# Patient Record
Sex: FEMALE | Race: BLACK OR AFRICAN AMERICAN | NOT HISPANIC OR LATINO | Employment: FULL TIME | URBAN - METROPOLITAN AREA
[De-identification: names, ages, dates, MRNs, and addresses within clinical notes are randomized per-mention and may not be internally consistent; named-entity substitution may affect disease eponyms.]

---

## 2017-01-10 ENCOUNTER — OFFICE VISIT - HEALTHEAST (OUTPATIENT)
Dept: FAMILY MEDICINE | Facility: CLINIC | Age: 27
End: 2017-01-10

## 2017-01-10 ENCOUNTER — COMMUNICATION - HEALTHEAST (OUTPATIENT)
Dept: TELEHEALTH | Facility: CLINIC | Age: 27
End: 2017-01-10

## 2017-01-10 DIAGNOSIS — R53.83 FATIGUE: ICD-10-CM

## 2017-01-10 DIAGNOSIS — E55.9 VITAMIN D DEFICIENCY: ICD-10-CM

## 2017-01-10 DIAGNOSIS — Z00.00 ROUTINE GENERAL MEDICAL EXAMINATION AT A HEALTH CARE FACILITY: ICD-10-CM

## 2017-01-10 LAB
CHOLEST SERPL-MCNC: 183 MG/DL
FASTING STATUS PATIENT QL REPORTED: YES
HDLC SERPL-MCNC: 58 MG/DL
LDLC SERPL CALC-MCNC: 117 MG/DL
TRIGL SERPL-MCNC: 42 MG/DL

## 2017-01-10 ASSESSMENT — MIFFLIN-ST. JEOR: SCORE: 1493.59

## 2017-02-06 ENCOUNTER — COMMUNICATION - HEALTHEAST (OUTPATIENT)
Dept: SCHEDULING | Facility: CLINIC | Age: 27
End: 2017-02-06

## 2017-02-07 ENCOUNTER — OFFICE VISIT - HEALTHEAST (OUTPATIENT)
Dept: FAMILY MEDICINE | Facility: CLINIC | Age: 27
End: 2017-02-07

## 2017-02-07 DIAGNOSIS — R42 VERTIGO: ICD-10-CM

## 2017-02-07 ASSESSMENT — MIFFLIN-ST. JEOR: SCORE: 1516.04

## 2017-02-13 ENCOUNTER — OFFICE VISIT - HEALTHEAST (OUTPATIENT)
Dept: OCCUPATIONAL THERAPY | Facility: REHABILITATION | Age: 27
End: 2017-02-13

## 2017-02-13 DIAGNOSIS — R26.81 UNSTEADINESS ON FEET: ICD-10-CM

## 2017-02-13 DIAGNOSIS — R11.0 NAUSEA: ICD-10-CM

## 2017-02-13 DIAGNOSIS — H81.11 BPPV (BENIGN PAROXYSMAL POSITIONAL VERTIGO), RIGHT: ICD-10-CM

## 2018-05-08 ENCOUNTER — OFFICE VISIT (OUTPATIENT)
Dept: FAMILY MEDICINE | Facility: CLINIC | Age: 28
End: 2018-05-08
Payer: COMMERCIAL

## 2018-05-08 VITALS
OXYGEN SATURATION: 100 % | HEART RATE: 63 BPM | BODY MASS INDEX: 27.54 KG/M2 | DIASTOLIC BLOOD PRESSURE: 62 MMHG | RESPIRATION RATE: 16 BRPM | WEIGHT: 171.38 LBS | HEIGHT: 66 IN | SYSTOLIC BLOOD PRESSURE: 103 MMHG | TEMPERATURE: 96.9 F

## 2018-05-08 DIAGNOSIS — Z00.00 ROUTINE GENERAL MEDICAL EXAMINATION AT A HEALTH CARE FACILITY: Primary | ICD-10-CM

## 2018-05-08 LAB
ALBUMIN SERPL-MCNC: 3.7 G/DL (ref 3.4–5)
ALP SERPL-CCNC: 58 U/L (ref 40–150)
ALT SERPL W P-5'-P-CCNC: 18 U/L (ref 0–50)
ANION GAP SERPL CALCULATED.3IONS-SCNC: 8 MMOL/L (ref 3–14)
AST SERPL W P-5'-P-CCNC: 11 U/L (ref 0–45)
BASOPHILS # BLD AUTO: 0 10E9/L (ref 0–0.2)
BASOPHILS NFR BLD AUTO: 0.4 %
BILIRUB SERPL-MCNC: 0.3 MG/DL (ref 0.2–1.3)
BUN SERPL-MCNC: 9 MG/DL (ref 7–30)
CALCIUM SERPL-MCNC: 8.9 MG/DL (ref 8.5–10.1)
CHLORIDE SERPL-SCNC: 109 MMOL/L (ref 94–109)
CHOLEST SERPL-MCNC: 155 MG/DL
CO2 SERPL-SCNC: 23 MMOL/L (ref 20–32)
CREAT SERPL-MCNC: 0.61 MG/DL (ref 0.52–1.04)
DIFFERENTIAL METHOD BLD: NORMAL
EOSINOPHIL # BLD AUTO: 0 10E9/L (ref 0–0.7)
EOSINOPHIL NFR BLD AUTO: 0.6 %
ERYTHROCYTE [DISTWIDTH] IN BLOOD BY AUTOMATED COUNT: 12.9 % (ref 10–15)
GFR SERPL CREATININE-BSD FRML MDRD: >90 ML/MIN/1.7M2
GLUCOSE SERPL-MCNC: 84 MG/DL (ref 70–99)
HCT VFR BLD AUTO: 39.1 % (ref 35–47)
HDLC SERPL-MCNC: 46 MG/DL
HGB BLD-MCNC: 12.4 G/DL (ref 11.7–15.7)
LDLC SERPL CALC-MCNC: 100 MG/DL
LYMPHOCYTES # BLD AUTO: 1.9 10E9/L (ref 0.8–5.3)
LYMPHOCYTES NFR BLD AUTO: 36 %
MCH RBC QN AUTO: 29.4 PG (ref 26.5–33)
MCHC RBC AUTO-ENTMCNC: 31.7 G/DL (ref 31.5–36.5)
MCV RBC AUTO: 93 FL (ref 78–100)
MONOCYTES # BLD AUTO: 0.5 10E9/L (ref 0–1.3)
MONOCYTES NFR BLD AUTO: 9.7 %
NEUTROPHILS # BLD AUTO: 2.8 10E9/L (ref 1.6–8.3)
NEUTROPHILS NFR BLD AUTO: 53.3 %
NONHDLC SERPL-MCNC: 109 MG/DL
PLATELET # BLD AUTO: 212 10E9/L (ref 150–450)
POTASSIUM SERPL-SCNC: 4.2 MMOL/L (ref 3.4–5.3)
PROT SERPL-MCNC: 7.5 G/DL (ref 6.8–8.8)
RBC # BLD AUTO: 4.22 10E12/L (ref 3.8–5.2)
SODIUM SERPL-SCNC: 140 MMOL/L (ref 133–144)
TRIGL SERPL-MCNC: 47 MG/DL
WBC # BLD AUTO: 5.3 10E9/L (ref 4–11)

## 2018-05-08 PROCEDURE — 85025 COMPLETE CBC W/AUTO DIFF WBC: CPT | Performed by: FAMILY MEDICINE

## 2018-05-08 PROCEDURE — 99385 PREV VISIT NEW AGE 18-39: CPT | Performed by: FAMILY MEDICINE

## 2018-05-08 PROCEDURE — 36415 COLL VENOUS BLD VENIPUNCTURE: CPT | Performed by: FAMILY MEDICINE

## 2018-05-08 PROCEDURE — 80053 COMPREHEN METABOLIC PANEL: CPT | Performed by: FAMILY MEDICINE

## 2018-05-08 PROCEDURE — 80061 LIPID PANEL: CPT | Performed by: FAMILY MEDICINE

## 2018-05-08 NOTE — MR AVS SNAPSHOT
After Visit Summary   5/8/2018    Ailyn Keane    MRN: [id number]           Patient Information     Date Of Birth          1990        Visit Information        Provider Department      5/8/2018 9:00 AM Gwen Quintero MD [facility]        Today's Diagnoses     Routine general medical examination at a health care facility    -  1      Care Instructions      Preventive Health Recommendations  Female Ages 26 - 39  Yearly exam:   See your health care provider every year in order to    Review health changes.     Discuss preventive care.      Review your medicines if you your doctor has prescribed any.    Until age 30: Get a Pap test every three years (more often if you have had an abnormal result).    After age 30: Talk to your doctor about whether you should have a Pap test every 3 years or have a Pap test with HPV screening every 5 years.   You do not need a Pap test if your uterus was removed (hysterectomy) and you have not had cancer.  You should be tested each year for STDs (sexually transmitted diseases), if you're at risk.   Talk to your provider about how often to have your cholesterol checked.  If you are at risk for diabetes, you should have a diabetes test (fasting glucose).  Shots: Get a flu shot each year. Get a tetanus shot every 10 years.   Nutrition:     Eat at least 5 servings of fruits and vegetables each day.    Eat whole-grain bread, whole-wheat pasta and brown rice instead of white grains and rice.    Talk to your provider about Calcium and Vitamin D.     Lifestyle    Exercise at least 150 minutes a week (30 minutes a day, 5 days of the week). This will help you control your weight and prevent disease.    Limit alcohol to one drink per day.    No smoking.     Wear sunscreen to prevent skin cancer.    See your dentist every six months for an exam and cleaning.            Follow-ups after your visit        Who to contact     If you have  "questions or need follow up information about today's clinic visit or your schedule please contact [facility] directly at [phone].  Normal or non-critical lab and imaging results will be communicated to you by MyChart, letter or phone within 4 business days after the clinic has received the results. If you do not hear from us within 7 days, please contact the clinic through AdAdaptedhart or phone. If you have a critical or abnormal lab result, we will notify you by phone as soon as possible.  Submit refill requests through Lysanda or call your pharmacy and they will forward the refill request to us. Please allow 3 business days for your refill to be completed.          Additional Information About Your Visit        AdAdaptedharServio Information     Lysanda lets you send messages to your doctor, view your test results, renew your prescriptions, schedule appointments and more. To sign up, go to [email or web address] . Click on \"Log in\" on the left side of the screen, which will take you to the Welcome page. Then click on \"Sign up Now\" on the right side of the page.     You will be asked to enter the access code listed below, as well as some personal information. Please follow the directions to create your username and password.     Your access code is: [email or web address]  Expires: 2018  9:16 AM     Your access code will  in 90 days. If you need help or a new code, please call your [facility] clinic or [phone].        Care EveryWhere ID     This is your Care EveryWhere ID. This could be used by other organizations to access your [facility] medical records  [id number]        Your Vitals Were     Pulse Temperature Respirations Height Last Period Pulse Oximetry    63 96.9  F (36.1  C) (Oral) 16 5' 6.25\" (1.683 m) (LMP Unknown) 100%    Breastfeeding? BMI (Body Mass Index)                No 27.45 kg/m2           Blood Pressure from Last 3 Encounters:   18 103/62   13 92/59    Weight from Last 3 " Encounters:   05/08/18 171 lb 6 oz (77.7 kg)   09/23/13 150 lb (68 kg)              We Performed the Following     CBC with platelets differential     Comprehensive metabolic panel     Lipid panel reflex to direct LDL Fasting        Primary Care Provider Fax #    Physician No Ref-Primary [phone]       No address on file        Equal Access to Services     RAMU FOFANA : Hadii aad ku hadasho Soomaali, waaxda luqadaha, qaybta kaalmada adeegyada, waxjazmin carmelita benadolfo mooney abdirizakaren bravo. So [facility] [phone].    ATENCIÓN: Si habla español, tiene a cordova disposición servicios gratuitos de asistencia lingüística. Llame al [phone].    We comply with applicable federal civil rights laws and [place] laws. We do not discriminate on the basis of race, color, national origin, age, disability, sex, sexual orientation, or gender identity.            Thank you!     Thank you for choosing [facility]  for your care. Our goal is always to provide you with excellent care. Hearing back from our patients is one way we can continue to improve our services. Please take a few minutes to complete the written survey that you may receive in the mail after your visit with us. Thank you!             Your Updated Medication List - Protect others around you: Learn how to safely use, store and throw away your medicines at www.disposemymeds.org.          This list is accurate as of 5/8/18  9:52 AM.  Always use your most recent med list.                   Brand Name Dispense Instructions for use Diagnosis    albuterol 108 (90 Base) MCG/ACT Inhaler    PROAIR HFA/PROVENTIL HFA/VENTOLIN HFA    3 Inhaler    Inhale 2 puffs into the lungs every 6 hours as needed for shortness of breath / dyspnea    Cough       guaiFENesin-dextromethorphan 100-10 MG/5ML syrup    ROBITUSSIN DM    560 mL    Take 5 mLs by mouth every 4 hours as needed for cough    Cough

## 2018-05-08 NOTE — PROGRESS NOTES
SUBJECTIVE:   CC: Ailyn Keane is an 27 year old woman who presents for preventive health visit.     Physical   Annual:     Getting at least 3 servings of Calcium per day::  Yes    Bi-annual eye exam::  Yes    Dental care twice a year::  NO    Sleep apnea or symptoms of sleep apnea::  None    Diet::  Regular (no restrictions)    Frequency of exercise::  None    Taking medications regularly::  Yes    Medication side effects::  Not applicable    Additional concerns today::  No            Current Chronic Medical Conditions  None    Surgical History  None    Family History  Mom- healthy  Dad- healthy  1/4  2 brothers-healthy  1 sister-- healthy  + diabetes in both sides of grandparents    Social History  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  Lives at home with family.  Tries to eat healthy.  No formal exercise.    HCM  Not sexually active.  Periods regular.  Speaks [place] and English-- [place]-born.      Today's PHQ-2 Score:   PHQ-2 ( 1999 Pfizer) 5/8/2018   Q1: Little interest or pleasure in doing things 0   Q2: Feeling down, depressed or hopeless 0   PHQ-2 Score 0   Q1: Little interest or pleasure in doing things Not at all   Q2: Feeling down, depressed or hopeless Not at all   PHQ-2 Score 0       Abuse: Current or Past(Physical, Sexual or Emotional)- No  Do you feel safe in your environment - Yes    Social History   Substance Use Topics     Smoking status: Never Smoker     Smokeless tobacco: Never Used     Alcohol use No         Reviewed orders with patient.  Reviewed health maintenance and updated orders accordingly - Yes  BP Readings from Last 3 Encounters:   05/08/18 103/62   09/23/13 92/59    Wt Readings from Last 3 Encounters:   05/08/18 171 lb 6 oz (77.7 kg)   09/23/13 150 lb (68 kg)                  Patient Active Problem List   Diagnosis     CARDIOVASCULAR SCREENING; LDL GOAL LESS THAN 160     History reviewed. No pertinent surgical history.    Social History    Substance Use Topics     Smoking status: Never Smoker     Smokeless tobacco: Never Used     Alcohol use No     Family History   Problem Relation Age of Onset     No Known Problems Mother      No Known Problems Father      DIABETES No family hx of      Hypertension No family hx of      CANCER No family hx of          Current Outpatient Prescriptions   Medication Sig Dispense Refill     albuterol (PROAIR HFA, PROVENTIL HFA, VENTOLIN HFA) 108 (90 BASE) MCG/ACT inhaler Inhale 2 puffs into the lungs every 6 hours as needed for shortness of breath / dyspnea (Patient not taking: Reported on 5/8/2018) 3 Inhaler 0     guaiFENesin-dextromethorphan (ROBITUSSIN DM) 100-10 MG/5ML syrup Take 5 mLs by mouth every 4 hours as needed for cough (Patient not taking: Reported on 5/8/2018) 560 mL 0     No Known Allergies    Mammogram not appropriate for this patient based on age.    Pertinent mammograms are reviewed under the imaging tab.  History of abnormal Pap smear: not sexually active- patient defers    Reviewed and updated as needed this visit by clinical staff         Reviewed and updated as needed this visit by Provider            Review of Systems  CONSTITUTIONAL: NEGATIVE for fever, chills, change in weight  INTEGUMENTARU/SKIN: NEGATIVE for worrisome rashes, moles or lesions  EYES: NEGATIVE for vision changes or irritation  ENT: NEGATIVE for ear, mouth and throat problems  RESP: NEGATIVE for significant cough or SOB  BREAST: NEGATIVE for masses, tenderness or discharge  CV: NEGATIVE for chest pain, palpitations or peripheral edema  GI: NEGATIVE for nausea, abdominal pain, heartburn, or change in bowel habits  : NEGATIVE for unusual urinary or vaginal symptoms. Periods are regular.  MUSCULOSKELETAL: NEGATIVE for significant arthralgias or myalgia  NEURO: NEGATIVE for weakness, dizziness or paresthesias  ENDOCRINE: NEGATIVE for temperature intolerance, skin/hair changes  PSYCHIATRIC: NEGATIVE for changes in mood or affect    "  OBJECTIVE:   /62  Pulse 63  Temp 96.9  F (36.1  C) (Oral)  Resp 16  Ht 5' 6.25\" (1.683 m)  Wt 171 lb 6 oz (77.7 kg)  LMP  (LMP Unknown)  SpO2 100%  Breastfeeding? No  BMI 27.45 kg/m2    Physical Exam  GENERAL: healthy, alert and no distress  EYES: Eyes grossly normal to inspection, PERRL and conjunctivae and sclerae normal  HENT: ear canals and TM's normal, nose and mouth without ulcers or lesions  NECK: no adenopathy, no asymmetry, masses, or scars and thyroid normal to palpation  RESP: lungs clear to auscultation - no rales, rhonchi or wheezes  CV: regular rate and rhythm, normal S1 S2, no S3 or S4, no murmur, click or rub, no peripheral edema and peripheral pulses strong  ABDOMEN: soft, nontender, no hepatosplenomegaly, no masses and bowel sounds normal  MS: no gross musculoskeletal defects noted, no edema  SKIN: no suspicious lesions or rashes  NEURO: Normal strength and tone, mentation intact and speech normal  PSYCH: mentation appears normal, affect normal/bright  LYMPH: no cervical, supraclavicular, axillary, or inguinal adenopathy    ASSESSMENT/PLAN:   1. Routine general medical examination at a health care facility    - CBC with platelets differential  - Comprehensive metabolic panel  - Lipid panel reflex to direct LDL Fasting    Fasting labs today.  Continue good diet and improved regular exercise.    COUNSELING:  Reviewed preventive health counseling, as reflected in patient instructions       Regular exercise       Healthy diet/nutrition         reports that she has never smoked. She has never used smokeless tobacco.    Estimated body mass index is 23.85 kg/(m^2) as calculated from the following:    Height as of 9/23/13: 5' 6.5\" (1.689 m).    Weight as of 9/23/13: 150 lb (68 kg).   Weight management plan: Discussed healthy diet and exercise guidelines and patient will follow up in 12 months in clinic to re-evaluate.    Counseling Resources:  ATP IV Guidelines  Pooled Cohorts Equation " Calculator  Breast Cancer Risk Calculator  FRAX Risk Assessment  ICSI Preventive Guidelines  Dietary Guidelines for Americans, 2010  CheckInPage's MyPlate  ASA Prophylaxis  Lung CA Screening    Gwen Quintero MD  [facility]

## 2018-07-05 ENCOUNTER — OFFICE VISIT (OUTPATIENT)
Dept: URGENT CARE | Facility: URGENT CARE | Age: 28
End: 2018-07-05
Payer: COMMERCIAL

## 2018-07-05 VITALS
DIASTOLIC BLOOD PRESSURE: 65 MMHG | SYSTOLIC BLOOD PRESSURE: 103 MMHG | OXYGEN SATURATION: 100 % | WEIGHT: 168 LBS | TEMPERATURE: 98 F | BODY MASS INDEX: 26.91 KG/M2 | HEART RATE: 59 BPM

## 2018-07-05 DIAGNOSIS — R05.9 COUGH: Primary | ICD-10-CM

## 2018-07-05 PROCEDURE — 99213 OFFICE O/P EST LOW 20 MIN: CPT | Performed by: INTERNAL MEDICINE

## 2018-07-05 RX ORDER — FLUTICASONE PROPIONATE 50 MCG
1-2 SPRAY, SUSPENSION (ML) NASAL DAILY
Qty: 1 BOTTLE | Refills: 0 | Status: SHIPPED | OUTPATIENT
Start: 2018-07-05 | End: 2023-03-10

## 2018-07-05 ASSESSMENT — ENCOUNTER SYMPTOMS
SORE THROAT: 1
FEVER: 0
SHORTNESS OF BREATH: 0
WHEEZING: 0
RHINORRHEA: 1

## 2018-07-05 NOTE — PROGRESS NOTES
SUBJECTIVE:   Ailyn Keane is a 27 year old female presenting with a chief complaint of   Chief Complaint   Patient presents with     URI     states x 1 week, has had nasal congestion, x 1 day dry cough.       She is an established patient of [facility].        Onset of symptoms was 1 week(s) ago.  Felt sick 1 day  Current and Associated symptoms: cough - -productive to dry and feels like to spit up mucous  Doesn't feel sick  Coughing spells  Treatment measures tried include Antihistamine.  Predisposing factors include ill contact: none  Thought allergies.        Review of Systems   Constitutional: Negative for fever.   HENT: Positive for postnasal drip, rhinorrhea and sore throat.         Sore throat & runny nose x 1 day   Respiratory: Negative for shortness of breath and wheezing.        No past medical history on file.  Family History   Problem Relation Age of Onset     No Known Problems Mother      No Known Problems Father      Diabetes No family hx of      Hypertension No family hx of      Cancer No family hx of      Current Outpatient Prescriptions   Medication Sig Dispense Refill     albuterol (PROAIR HFA, PROVENTIL HFA, VENTOLIN HFA) 108 (90 BASE) MCG/ACT inhaler Inhale 2 puffs into the lungs every 6 hours as needed for shortness of breath / dyspnea (Patient not taking: Reported on 5/8/2018) 3 Inhaler 0     fluticasone (FLONASE) 50 MCG/ACT spray Spray 1-2 sprays into both nostrils daily 1 Bottle 0     guaiFENesin-dextromethorphan (ROBITUSSIN DM) 100-10 MG/5ML syrup Take 5 mLs by mouth every 4 hours as needed for cough (Patient not taking: Reported on 5/8/2018) 560 mL 0     Social History   Substance Use Topics     Smoking status: Never Smoker     Smokeless tobacco: Never Used     Alcohol use No       OBJECTIVE  /65  Pulse 59  Temp 98  F (36.7  C) (Oral)  Wt 168 lb (76.2 kg)  SpO2 100%  BMI 26.91 kg/m2    Physical Exam   Constitutional: She appears well-developed and well-nourished.   HENT:   Tm  clear b  pnd   Cardiovascular: Normal rate, regular rhythm and normal heart sounds.    Pulmonary/Chest: Effort normal and breath sounds normal.   Lymphadenopathy:     She has no cervical adenopathy.       Labs:  No results found for this or any previous visit (from the past 24 hour(s)).        ASSESSMENT:      ICD-10-CM    1. Cough R05 fluticasone (FLONASE) 50 MCG/ACT spray        Medical Decision Making:    Differential Diagnosis:  Postnasal drip - most likely allergies    Doubt asthma      Serious Comorbid Conditions:  Adult:  None    PLAN:      Patient Instructions   Flonase nasal steroid  Zyrtec or claritin 10 mg daily  Fluids  Rest.    Honey.    Call or return to clinic if symptoms worsen or fail to improve as anticipated.

## 2018-07-05 NOTE — MR AVS SNAPSHOT
After Visit Summary   7/5/2018    Ailyn Keane    MRN: [id number]           Patient Information     Date Of Birth          1990        Visit Information        Provider Department      7/5/2018 6:10 PM Marissa Perez MD [facility]        Today's Diagnoses     Cough    -  1      Care Instructions    Flonase nasal steroid  Zyrtec or claritin 10 mg daily  Fluids  Rest.    Honey.    Call or return to clinic if symptoms worsen or fail to improve as anticipated.            Follow-ups after your visit        Who to contact     If you have questions or need follow up information about today's clinic visit or your schedule please contact [facility] directly at [phone].  Normal or non-critical lab and imaging results will be communicated to you by Bookyahart, letter or phone within 4 business days after the clinic has received the results. If you do not hear from us within 7 days, please contact the clinic through Bookyahart or phone. If you have a critical or abnormal lab result, we will notify you by phone as soon as possible.  Submit refill requests through Sutus or call your pharmacy and they will forward the refill request to us. Please allow 3 business days for your refill to be completed.          Additional Information About Your Visit        MyChart Information     Sutus gives you secure access to your electronic health record. If you see a primary care provider, you can also send messages to your care team and make appointments. If you have questions, please call your primary care clinic.  If you do not have a primary care provider, please call [phone] and they will assist you.        Care EveryWhere ID     This is your Care EveryWhere ID. This could be used by other organizations to access your [facility] medical records  [id number]        Your Vitals Were     Pulse Temperature Pulse Oximetry BMI (Body Mass Index)          59 98   F (36.7  C) (Oral) 100% 26.91 kg/m2         Blood Pressure from Last 3 Encounters:   07/05/18 103/65   05/08/18 103/62   09/23/13 92/59    Weight from Last 3 Encounters:   07/05/18 168 lb (76.2 kg)   05/08/18 171 lb 6 oz (77.7 kg)   09/23/13 150 lb (68 kg)              Today, you had the following     No orders found for display         Today's Medication Changes          These changes are accurate as of 7/5/18  6:57 PM.  If you have any questions, ask your nurse or doctor.               Start taking these medicines.        Dose/Directions    fluticasone 50 MCG/ACT spray   Commonly known as:  FLONASE   Used for:  Cough   Started by:  Marissa Perez MD        Dose:  1-2 spray   Spray 1-2 sprays into both nostrils daily   Quantity:  1 Bottle   Refills:  0            Where to get your medicines      These medications were sent to [id number] - [place], [place] - [address] AT [facility] & [address]  [address], [place] [address]     Phone:  [phone]     fluticasone 50 MCG/ACT spray                Primary Care Provider Fax #    Physician No Ref-Primary [phone]       No address on file        Equal Access to Services     RAMU FOFANA AH: Yvette guilleno Sosaundraali, waaxda luqadaha, qaybta kaalmada adeegyada, waxay carmelita bravo. So [facility] [phone].    ATENCIÓN: Si habla español, tiene a cordova disposición servicios gratuitos de asistencia lingüística. Llame al [phone].    We comply with applicable federal civil rights laws and [place] laws. We do not discriminate on the basis of race, color, national origin, age, disability, sex, sexual orientation, or gender identity.            Thank you!     Thank you for choosing [facility]  for your care. Our goal is always to provide you with excellent care. Hearing back from our patients is one way we can continue to improve our services. Please take a few minutes to complete the  written survey that you may receive in the mail after your visit with us. Thank you!             Your Updated Medication List - Protect others around you: Learn how to safely use, store and throw away your medicines at www.disposemymeds.org.          This list is accurate as of 7/5/18  6:57 PM.  Always use your most recent med list.                   Brand Name Dispense Instructions for use Diagnosis    albuterol 108 (90 Base) MCG/ACT Inhaler    PROAIR HFA/PROVENTIL HFA/VENTOLIN HFA    3 Inhaler    Inhale 2 puffs into the lungs every 6 hours as needed for shortness of breath / dyspnea    Cough       fluticasone 50 MCG/ACT spray    FLONASE    1 Bottle    Spray 1-2 sprays into both nostrils daily    Cough       guaiFENesin-dextromethorphan 100-10 MG/5ML syrup    ROBITUSSIN DM    560 mL    Take 5 mLs by mouth every 4 hours as needed for cough    Cough

## 2018-11-10 ENCOUNTER — RADIANT APPOINTMENT (OUTPATIENT)
Dept: GENERAL RADIOLOGY | Facility: CLINIC | Age: 28
End: 2018-11-10
Attending: PHYSICIAN ASSISTANT
Payer: COMMERCIAL

## 2018-11-10 ENCOUNTER — OFFICE VISIT (OUTPATIENT)
Dept: URGENT CARE | Facility: URGENT CARE | Age: 28
End: 2018-11-10
Payer: COMMERCIAL

## 2018-11-10 VITALS
HEART RATE: 72 BPM | HEIGHT: 67 IN | DIASTOLIC BLOOD PRESSURE: 68 MMHG | SYSTOLIC BLOOD PRESSURE: 104 MMHG | OXYGEN SATURATION: 97 % | WEIGHT: 168 LBS | TEMPERATURE: 99.5 F | BODY MASS INDEX: 26.37 KG/M2

## 2018-11-10 DIAGNOSIS — R05.8 PRODUCTIVE COUGH: ICD-10-CM

## 2018-11-10 DIAGNOSIS — J06.9 VIRAL URI WITH COUGH: Primary | ICD-10-CM

## 2018-11-10 PROCEDURE — 99213 OFFICE O/P EST LOW 20 MIN: CPT | Performed by: PHYSICIAN ASSISTANT

## 2018-11-10 PROCEDURE — 71046 X-RAY EXAM CHEST 2 VIEWS: CPT

## 2018-11-10 RX ORDER — ALBUTEROL SULFATE 90 UG/1
2 AEROSOL, METERED RESPIRATORY (INHALATION) EVERY 6 HOURS PRN
Qty: 1 INHALER | Refills: 0 | Status: SHIPPED | OUTPATIENT
Start: 2018-11-10 | End: 2018-12-05

## 2018-11-10 NOTE — PATIENT INSTRUCTIONS
(J06.9,  B97.89) Viral URI with cough  (primary encounter diagnosis)  Comment:   Plan: albuterol (PROAIR HFA/PROVENTIL HFA/VENTOLIN         HFA) 108 (90 Base) MCG/ACT inhaler            (R05) Productive cough  Comment:   Plan: XR Chest 2 Views            Saline nasal spray during the day  You may try benadryl 25 mg at bedtime as needed for post nasal drip that triggers cough    Rest.    Ibuprofen as needed for chest discomfort likely secondary to chest wall irritation from cough. Follow up with primary clinic should symptoms persist or worsen.

## 2018-11-10 NOTE — PROGRESS NOTES
"SUBJECTIVE:   Ailyn Keane is a 28 year old female presenting with a chief complaint of   1) nasal congestion for the past 2.5 days.  2) couch last night with wheezing.    Low grade fever  Onset of symptoms was as above.    Chest discomfort with cough.  Chest discomfort worse with movement.    Course of illness is worsening.    Severity moderate  Current and Associated symptoms: as above  Treatment measures tried include as above.  Predisposing factors include None.  Used a family member's inhaler last night with some relief.      No past medical history on file.  Patient Active Problem List   Diagnosis     CARDIOVASCULAR SCREENING; LDL GOAL LESS THAN 160     Social History   Substance Use Topics     Smoking status: Never Smoker     Smokeless tobacco: Never Used     Alcohol use No       ROS:  CONSTITUTIONAL:NEGATIVE for fever, chills, change in weight  INTEGUMENTARY/SKIN: NEGATIVE for worrisome rashes, moles or lesions  ENT/MOUTH: as per HPI  RESP:as per HPI  CV: NEGATIVE for chest pain, palpitations or peripheral edema  MUSCULOSKELETAL: as per HPI  NEURO: NEGATIVE for weakness, dizziness or paresthesias  Review of systems negative except as stated above.    OBJECTIVE  :/68  Pulse 72  Temp 99.5  F (37.5  C) (Oral)  Ht 5' 7\" (1.702 m)  Wt 168 lb (76.2 kg)  LMP 10/10/2018 (Approximate)  SpO2 97%  BMI 26.31 kg/m2  GENERAL APPEARANCE: healthy, alert and no distress  EYES: EOMI,  PERRL, conjunctiva clear  HENT: ear canals and TM's normal.  Nose and mouth without ulcers, erythema or lesions  HENT: nasal turbinates boggy with bluish hue and rhinorrhea clear  NECK: supple, nontender, no lymphadenopathy  RESP: lungs clear to auscultation - no rales, rhonchi or wheezes  CV: regular rates and rhythm, normal S1 S2, no murmur noted  ABDOMEN:  soft, nontender, no HSM or masses and bowel sounds normal  NEURO: Normal strength and tone, sensory exam grossly normal,  normal speech and mentation  SKIN: no suspicious " lesions or rashes    (J06.9,  B97.89) Viral URI with cough  (primary encounter diagnosis)  Comment:   Plan: albuterol (PROAIR HFA/PROVENTIL HFA/VENTOLIN         HFA) 108 (90 Base) MCG/ACT inhaler            (R05) Productive cough  Comment:   Plan: XR Chest 2 Views            Saline nasal spray during the day  You may try benadryl 25 mg at bedtime as needed for post nasal drip that triggers cough    Rest.    Ibuprofen as needed for chest discomfort likely secondary to chest wall irritation from cough. Follow up with primary clinic should symptoms persist or worsen.    Patient expresses understanding and agreement with the assessment and plan as above.

## 2018-11-10 NOTE — MR AVS SNAPSHOT
After Visit Summary   11/10/2018    Ailyn Keane    MRN: [id number]           Patient Information     Date Of Birth          1990        Visit Information        Provider Department      11/10/2018 4:10 PM Yahaira Salazar PA-C [facility]        Today's Diagnoses     Viral URI with cough    -  1    Productive cough          Care Instructions    (J06.9,  B97.89) Viral URI with cough  (primary encounter diagnosis)  Comment:   Plan: albuterol (PROAIR HFA/PROVENTIL HFA/VENTOLIN         HFA) 108 (90 Base) MCG/ACT inhaler            (R05) Productive cough  Comment:   Plan: XR Chest 2 Views            Saline nasal spray during the day  You may try benadryl 25 mg at bedtime as needed for post nasal drip that triggers cough    Rest.    Ibuprofen as needed for chest discomfort likely secondary to chest wall irritation from cough. Follow up with primary clinic should symptoms persist or worsen.              Follow-ups after your visit        Who to contact     If you have questions or need follow up information about today's clinic visit or your schedule please contact [facility] directly at [phone].  Normal or non-critical lab and imaging results will be communicated to you by Ancerahart, letter or phone within 4 business days after the clinic has received the results. If you do not hear from us within 7 days, please contact the clinic through Ancerahart or phone. If you have a critical or abnormal lab result, we will notify you by phone as soon as possible.  Submit refill requests through Tumbie or call your pharmacy and they will forward the refill request to us. Please allow 3 business days for your refill to be completed.          Additional Information About Your Visit        MyChart Information     Tumbie gives you secure access to your electronic health record. If you see a primary care provider, you can also send messages to your care  "team and make appointments. If you have questions, please call your primary care clinic.  If you do not have a primary care provider, please call [phone] and they will assist you.        Care EveryWhere ID     This is your Care EveryWhere ID. This could be used by other organizations to access your [place] medical records  [id number]        Your Vitals Were     Pulse Temperature Height Last Period Pulse Oximetry BMI (Body Mass Index)    72 99.5  F (37.5  C) (Oral) 5' 7\" (1.702 m) 10/10/2018 (Approximate) 97% 26.31 kg/m2       Blood Pressure from Last 3 Encounters:   11/10/18 104/68   07/05/18 103/65   05/08/18 103/62    Weight from Last 3 Encounters:   11/10/18 168 lb (76.2 kg)   07/05/18 168 lb (76.2 kg)   05/08/18 171 lb 6 oz (77.7 kg)                 Today's Medication Changes          These changes are accurate as of 11/10/18  5:09 PM.  If you have any questions, ask your nurse or doctor.               These medicines have changed or have updated prescriptions.        Dose/Directions    * albuterol 108 (90 Base) MCG/ACT inhaler   Commonly known as:  PROAIR HFA/PROVENTIL HFA/VENTOLIN HFA   This may have changed:  Another medication with the same name was added. Make sure you understand how and when to take each.   Used for:  Cough   Changed by:  Yahaira Salazar PA-C        Dose:  2 puff   Inhale 2 puffs into the lungs every 6 hours as needed for shortness of breath / dyspnea   Quantity:  3 Inhaler   Refills:  0       * albuterol 108 (90 Base) MCG/ACT inhaler   Commonly known as:  PROAIR HFA/PROVENTIL HFA/VENTOLIN HFA   This may have changed:  You were already taking a medication with the same name, and this prescription was added. Make sure you understand how and when to take each.   Used for:  Viral URI with cough   Changed by:  Yahaira Salazar PA-C        Dose:  2 puff   Inhale 2 puffs into the lungs every 6 hours as needed for shortness of breath / dyspnea or wheezing   Quantity:  " 1 Inhaler   Refills:  0       * Notice:  This list has 2 medication(s) that are the same as other medications prescribed for you. Read the directions carefully, and ask your doctor or other care provider to review them with you.         Where to get your medicines      These medications were sent to [id number] - [place], [place] - [address]  [address], [place] [address]     Phone:  [phone]     albuterol 108 (90 Base) MCG/ACT inhaler                Primary Care Provider Fax #    Physician No Ref-Primary [phone]       No address on file        Equal Access to Services     [facility]: Hadii svitlana duran Sogayle, waaxda luqadaha, qaybta kaalmada jesicayaalicia, jorge alvarenga . So [facility] [phone].    ATENCIÓN: Si habla español, tiene a cordova disposición servicios gratuitos de asistencia lingüística. [facility] [phone].    We comply with applicable federal civil rights laws and [place] laws. We do not discriminate on the basis of race, color, national origin, age, disability, sex, sexual orientation, or gender identity.            Thank you!     Thank you for choosing [facility]  for your care. Our goal is always to provide you with excellent care. Hearing back from our patients is one way we can continue to improve our services. Please take a few minutes to complete the written survey that you may receive in the mail after your visit with us. Thank you!             Your Updated Medication List - Protect others around you: Learn how to safely use, store and throw away your medicines at www.disposemymeds.org.          This list is accurate as of 11/10/18  5:09 PM.  Always use your most recent med list.                   Brand Name Dispense Instructions for use Diagnosis    * albuterol 108 (90 Base) MCG/ACT inhaler    PROAIR HFA/PROVENTIL HFA/VENTOLIN HFA    3 Inhaler    Inhale 2 puffs into the lungs  every 6 hours as needed for shortness of breath / dyspnea    Cough       * albuterol 108 (90 Base) MCG/ACT inhaler    PROAIR HFA/PROVENTIL HFA/VENTOLIN HFA    1 Inhaler    Inhale 2 puffs into the lungs every 6 hours as needed for shortness of breath / dyspnea or wheezing    Viral URI with cough       fluticasone 50 MCG/ACT spray    FLONASE    1 Bottle    Spray 1-2 sprays into both nostrils daily    Cough       guaiFENesin-dextromethorphan 100-10 MG/5ML syrup    ROBITUSSIN DM    560 mL    Take 5 mLs by mouth every 4 hours as needed for cough    Cough       IBUPROFEN PO           * Notice:  This list has 2 medication(s) that are the same as other medications prescribed for you. Read the directions carefully, and ask your doctor or other care provider to review them with you.

## 2018-12-05 DIAGNOSIS — J06.9 VIRAL URI WITH COUGH: ICD-10-CM

## 2018-12-05 NOTE — TELEPHONE ENCOUNTER
Reason for Call:  Medication or medication refill:    Do you use a [facility]?  Name of the pharmacy and phone number for the current request: [id number] - [place], [place] - [address] AT [facility] OF [address]    Name of the medication requested: albuterol (PROAIR HFA/PROVENTIL HFA/VENTOLIN HFA) 108 (90 Base) MCG/ACT inhaler    Other request: Pt came in and would like a refill on this inhaler. She says she is out and said she needs it. Please Advise thank you    Can we leave a detailed message on this number? YES    Phone number patient can be reached at: Home number on file [phone] (home)    Best Time: anytime    Call taken on 12/5/2018 at 1:05 PM by Yamilet Field

## 2018-12-05 NOTE — TELEPHONE ENCOUNTER
Providers-Please review and advise/sign if agree.    Albuterol inhaler last prescribed in Urgent Care on 11/10/18 for viral URI    Writer called patient who stated:  1. Cough came back about 2 days ago  2. Still experiencing cold symptoms    Writer recommended:  1. Office visit/Urgent Care for evaluation    Patient declined office visit/Urgent Care and would like a provider to refill this inhaler.    Writer explained to patient further assessment may be recommended by provider(s).    Thank you!  NBA TorresN, RN        Requested Prescriptions   Pending Prescriptions Disp Refills     albuterol (PROAIR HFA/PROVENTIL HFA/VENTOLIN HFA) 108 (90 Base) MCG/ACT inhaler 1 Inhaler 0     Sig: Inhale 2 puffs into the lungs every 6 hours as needed for shortness of breath / dyspnea or wheezing    There is no refill protocol information for this order

## 2018-12-06 RX ORDER — ALBUTEROL SULFATE 90 UG/1
2 AEROSOL, METERED RESPIRATORY (INHALATION) EVERY 6 HOURS PRN
Qty: 1 INHALER | Refills: 0 | Status: SHIPPED | OUTPATIENT
Start: 2018-12-06 | End: 2023-03-10

## 2019-05-20 ENCOUNTER — OFFICE VISIT (OUTPATIENT)
Dept: URGENT CARE | Facility: URGENT CARE | Age: 29
End: 2019-05-20
Payer: COMMERCIAL

## 2019-05-20 VITALS
SYSTOLIC BLOOD PRESSURE: 112 MMHG | WEIGHT: 171 LBS | HEART RATE: 71 BPM | DIASTOLIC BLOOD PRESSURE: 71 MMHG | OXYGEN SATURATION: 100 % | TEMPERATURE: 98 F | BODY MASS INDEX: 26.78 KG/M2

## 2019-05-20 DIAGNOSIS — M79.644 PAIN OF RIGHT MIDDLE FINGER: Primary | ICD-10-CM

## 2019-05-20 DIAGNOSIS — L03.011 PARONYCHIA OF RIGHT MIDDLE FINGER: ICD-10-CM

## 2019-05-20 PROCEDURE — 10060 I&D ABSCESS SIMPLE/SINGLE: CPT | Performed by: FAMILY MEDICINE

## 2019-05-20 RX ORDER — SULFAMETHOXAZOLE/TRIMETHOPRIM 800-160 MG
1 TABLET ORAL 2 TIMES DAILY
Qty: 14 TABLET | Refills: 0 | Status: SHIPPED | OUTPATIENT
Start: 2019-05-20 | End: 2019-05-27

## 2019-05-20 NOTE — PROGRESS NOTES
SUBJECTIVE:  Ailyn Keane is a 28 year old female who presents complaining of right third finger pain.  She has noted some redness and swelling along the cuticle margin.  Symptoms began two days ago.   Severity: moderate.  She denies any trauma to the area.  No fevers or chills noted.  No migration of redness or swelling proximally.    History reviewed. No pertinent past medical history.  Current Outpatient Medications   Medication Sig Dispense Refill     sulfamethoxazole-trimethoprim (BACTRIM DS/SEPTRA DS) 800-160 MG tablet Take 1 tablet by mouth 2 times daily for 7 days 14 tablet 0     albuterol (PROAIR HFA, PROVENTIL HFA, VENTOLIN HFA) 108 (90 BASE) MCG/ACT inhaler Inhale 2 puffs into the lungs every 6 hours as needed for shortness of breath / dyspnea (Patient not taking: Reported on 5/20/2019) 3 Inhaler 0     albuterol (PROAIR HFA/PROVENTIL HFA/VENTOLIN HFA) 108 (90 Base) MCG/ACT inhaler Inhale 2 puffs into the lungs every 6 hours as needed for shortness of breath / dyspnea or wheezing (Patient not taking: Reported on 5/20/2019) 1 Inhaler 0     fluticasone (FLONASE) 50 MCG/ACT spray Spray 1-2 sprays into both nostrils daily (Patient not taking: Reported on 11/10/2018) 1 Bottle 0     guaiFENesin-dextromethorphan (ROBITUSSIN DM) 100-10 MG/5ML syrup Take 5 mLs by mouth every 4 hours as needed for cough (Patient not taking: Reported on 5/8/2018) 560 mL 0     IBUPROFEN PO        Social History     Tobacco Use     Smoking status: Never Smoker     Smokeless tobacco: Never Used   Substance Use Topics     Alcohol use: No       ROS:  Review of Systems  10 point ROS of systems including Constitutional, Eyes, Respiratory, Cardiovascular, Gastroenterology, Genitourinary, Psychiatric were all negative except for pertinent positives noted in my HPI           OBJECTIVE:  /71   Pulse 71   Temp 98  F (36.7  C) (Oral)   Wt 77.6 kg (171 lb)   SpO2 100%   BMI 26.78 kg/m    Hand exam:  examination of first finger  reveals paronychia with redness, tenderness and swelling along distal finger.    After informed consent   ethylchloride was sprayed  Then area cleaned then a 11 blade scalpel was used to make an opening   Pus was drained   Then area cleaned with bacitracin and gauze       ASSESSMENT:   Angeli Robertson was seen today for urgent care and finger.    Diagnoses and all orders for this visit:    Pain of right middle finger    Paronychia of right middle finger  -     sulfamethoxazole-trimethoprim (BACTRIM DS/SEPTRA DS) 800-160 MG tablet; Take 1 tablet by mouth 2 times daily for 7 days    if swelling and redness persists after 2 days of treatment with soaking and topical antibiotic   Then should start the bactrim      PLAN:  See orders in epic    Procedure Note: After informed consent   ethylchloride was sprayed  Then area cleaned then a 11 blade scalpel was used to make an opening   Pus was drained   Then area cleaned with bacitracin and gauze     The finger was first soaked in warm, soapy water. The distal finger was prepped with alcohol and under clean conditions, abscess was incised with #11 blade.  Purulent fluid was drained.  No complications.  Area was then bandaged. Digital block was not used.  Discussed with pt about after care   Look for any worsening signs of infection     Follow up if  symptoms fail to improve or worsens   Pt understood and agreed with plan     Naila Helms MD

## 2019-05-20 NOTE — PATIENT INSTRUCTIONS
Patient Education     Paronychia of the Finger or Toe  Paronychia is an infection near a fingernail or toenail. It usually occurs when an opening in the cuticle or an ingrown toenail lets bacteria under the skin.  The infection will need to be drained if pus is present. If the infection has been caught early, you may need only antibiotic treatment. Healing will take about 1 to 2 weeks.  Home care  Follow these guidelines when caring for yourself at home:    Clean and soak the toe or finger. Do this 2 times a day for the first 3 days. To do so:  ? Soak your foot or hand in a tub of warm water for 5 minutes. Or hold your toe or finger under a faucet of warm running water for 5 minutes.  ? Clean any crust away with soap and water using a cotton swab.  ? Put antibiotic ointment on the infected area.    Change the dressing daily or any time it gets dirty.    If you were given antibiotics, take them as directed until they are all gone.    If your infection is on a toe, wear comfortable shoes with a lot of toe room. You can also wear open-toed sandals while your toe heals.    You may use over-the-counter medicine (acetaminophen or ibuprofen to help with pain, unless another medicine was prescribed. If you have chronic liver or kidney disease, talk with your healthcare provider before using these medicines. Also talk with your provider if you've had a stomach ulcer or GI (gastrointestinal) bleeding.  Prevention  The following can prevent paronychia:    Avoid cutting or playing with your cuticles at home.    Don't bite your nails.    Don't suck on your thumbs or fingers.  Follow-up care  Follow up with your healthcare provider, or as advised.  When to seek medical advice  Call your healthcare provider right away if any of these occur:    Redness, pain, or swelling of the finger or toe gets worse    Red streaks in the skin leading away from the wound    Pus or fluid draining from the nail area    Fever of 100.4 F (38 C) or  higher, or as directed by your provider  Date Last Reviewed: 8/1/2016 2000-2018 [employer]. [address] [place] [address], [place] [address]. All rights reserved. This information is not intended as a substitute for professional medical care. Always follow your healthcare professional's instructions.

## 2020-03-02 ENCOUNTER — HEALTH MAINTENANCE LETTER (OUTPATIENT)
Age: 30
End: 2020-03-02

## 2020-09-21 NOTE — PATIENT INSTRUCTIONS
Flonase nasal steroid  Zyrtec or claritin 10 mg daily  Fluids  Rest.    Honey.    Call or return to clinic if symptoms worsen or fail to improve as anticipated.    
No adenopathy or splenomegaly. No cervical or inguinal lymphadenopathy.

## 2020-12-20 ENCOUNTER — HEALTH MAINTENANCE LETTER (OUTPATIENT)
Age: 30
End: 2020-12-20

## 2021-04-18 ENCOUNTER — HEALTH MAINTENANCE LETTER (OUTPATIENT)
Age: 31
End: 2021-04-18

## 2021-05-30 VITALS — WEIGHT: 164 LBS | BODY MASS INDEX: 25.74 KG/M2 | HEIGHT: 67 IN

## 2021-05-30 VITALS — HEIGHT: 67 IN | BODY MASS INDEX: 26.24 KG/M2 | WEIGHT: 167.2 LBS

## 2021-06-08 NOTE — PROGRESS NOTES
[facility]   Vestibular Initial Evaluation    Patient Name: Ailyn Keane  Date of evaluation: 2017  Referral Diagnosis: Vertigo  Referring provider: Tereso Armstrong  Visit Diagnosis:     ICD-10-CM    1. BPPV (benign paroxysmal positional vertigo), right H81.11    2. Nausea R11.0    3. Unsteadiness on feet R26.81        Assessment:      Patient has positive right [place]. Treated with right Epley maneuver X5 and instructed on home Epley.    Goals:  Pt. will bend: to dress;to clean;in 12 weeks (without vertigo)  Patient will twist/turn : for bed mobilitiy;in 12 weeks (without vertigo)  Patient Turn Head: without vertigo;without dizziness;for driving;for conversation;for computer;in 12 weeks  Patient will look up / down: without vertigo;for computer work;for drinking;for reading;in 12 weeks    Patient's expectations/goals are realistic.    Barriers to Learning or Achieving Goals:  No Barriers.       Plan / Patient Instructions:        Plan of Care:   Communication with: Referral Source  Patient Related Instruction: Nature of Condition;Treatment plan and rationale;Basis of treatment;Expected outcome  Times per Week: 1  Number of Weeks: 12  Number of Visits: 12  Neuromuscular Reeducation: vestibular  Canolith Repostioning:      Plan for next visit: repeat positional tests     Subjective:         History of Present Illness:    Ailyn is a 26 y.o. female who presents to therapy today with complaints of vertigo, unsteadiness and nausea. Patient had sudden onset of symptoms about 2 weeks. Symptoms are getting better. She denies history of similar symptoms. Patient denies ear pain. Patient denies hearing changes.    Pain Ratin    Functional limitations are described as occurring with:   balance, bending, bed mobility, head turns, looking up or down         Objective:      Note: Items left blank indicates the item was not performed or not indicated at the time of the  evaluation.    Patient Outcome Measures : DHI 28      Vestibular Disorder Examination  1. BPPV (benign paroxysmal positional vertigo), right     2. Nausea     3. Unsteadiness on feet       Precautions/Restrictions: None  Posture Observation:      General standing posture is normal.    ROM:  Not Tested    Strength: Not Tested    Functional Mobility: good      Oculomotor Assessment: Not tested    VOR Function: Not tested    Positional Tests:  Hallpike Right:  Abnormal - Nystagmus right torsional, up beating  Hallpike Left:  Negative    Balance Assessment: Not tested    Treatment Today: Patient treated with right Epley maneuver X5. Patient instructed on home Epley as well.  TREATMENT MINUTES COMMENTS   Evaluation 20    Self-care/ Home management     Neuromuscular Re-education     Canalith repositioning procedure 25          Total 45    Blank areas are intentional and mean the treatment did not include these items.     OT Evaluation Code: (Please list factors)   Comorbidities: None in chart and none per patient report  Profile/History Review: Brief    Need for eval modification: No    # Treatment options: Limited    Clinical Decision Making:  Low      Occupational Profile/ Medical and Therapy History and Comorbidities Occupational Performance Clinical Decision Making   (Complexity)   brief history with review of medical/therapy records related to the presenting problem.  No comorbidities 1-3 Performance deficits that result in activity limitations and/or participation restrictions.    No Assessment Modification  Low complexity, which includes  problem-focused assessments, and consideration of a limited number of treatment options.      expanded review of medical/therapy records and additional review of physical, cognitive and psychosocial history.    May have comorbidities 3-5 Performance deficits that result in activity limitations and/or participation restrictions.    Minimal to moderate modification of assessment  Moderate complexity, which includes analysis of data from detailed assessments, and consideration of several treatment options.         Review of medical/therapy records and extensive additional review of physical, cognitive and psychosocial history.  Comorbidities affect occupational performance 5 or more Performance deficits that result in activity limitations and/or participation restrictions.    Significant modification of assessment High complexity, analysis of  Occupational profile and data,  Comprehensive assessments, multiple treatment options.            Tere Mathew  2/13/2017  9:05 AM

## 2021-06-08 NOTE — PROGRESS NOTES
"ASSESSMENT/PLAN:  1. Vertigo  Ambulatory referral to PT/OT       This is a 26-year-old female, who describes fairly classic vertigo.  This is most symptomatic when rolling over in bed.  She is allergic to control symptoms mostly during the day.  Exam is otherwise benign.  We discussed symptomatic treatment.  I think she would benefit from vestibular rehab and this referral was made.  She is comfortable with this approach to treatment.        There are no discontinued medications.  There are no Patient Instructions on file for this visit.    Chief Complaint:  Chief Complaint   Patient presents with     Dizziness     x1 week when supine       HPI:   Ailyn Keane is a 26 y.o. female c/o  2 weeks ago - had \"super episode\" of dizziness - when rolling in bed  Next morning - felt really nauseous and dizzy  Felt okay for a couple days  Few days later, had symptoms again    At the gym a couple days ago - was doing crunches  Pretty careful  Doesn't happen when driving        PMH:   There are no active problems to display for this patient.    History reviewed. No pertinent past medical history.  History reviewed. No pertinent surgical history.  Social History     Social History     Marital status: Single     Spouse name: N/A     Number of children: N/A     Years of education: N/A     Occupational History     RN - postpartum care - [employer]      Social History Main Topics     Smoking status: Never Smoker     Smokeless tobacco: Not on file     Alcohol use No     Drug use: No     Sexual activity: Yes     Birth control/ protection: None     Other Topics Concern     Not on file     Social History Narrative       Meds:    Current Outpatient Prescriptions:      LACTOBACILLUS ACIDOPHILUS (PROBIOTIC ORAL), Take by mouth as needed., Disp: , Rfl:     Allergies:  No Known Allergies    ROS:  Pertinent positives as noted in HPI; otherwise 12 point ROS negative.      Physical Exam:  EXAM:  Visit Vitals     /60     Pulse (!) 56     " "Temp 97.9  F (36.6  C) (Oral)     Resp 14     Ht 5' 7\" (1.702 m)     Wt 167 lb 3.2 oz (75.8 kg)     LMP 02/01/2017     BMI 26.19 kg/m2      Gen:  NAD, appears well, well-hydrated  HEENT:  TMs nl, oropharynx benign, nasal mucosa nl, conjunctiva clear, EOMI except nystagmus on lateral gaze, PERRL  Neck:  Supple, no adenopathy, no thyromegaly, no carotid bruits, no JVD  Lungs:  Clear to auscultation bilaterally  Cor:  RRR no murmur  Abd:  Soft, nontender, BS+, no masses, no guarding or rebound, no HSM  Extr:  Neg.  Neuro:  No asymmetry, Nl motor tone/strength, nl sensation, reflexes =, gait nl, nl coordination, CN intact, Romberg normal  Skin:  Warm/dry        "

## 2021-06-08 NOTE — PROGRESS NOTES
"Subjective: This patient comes in for evaluation she is a 26-year-old female.  She is here for a physical did not want a breast exam or Pap or pelvic.    She is fasting    Her only real symptoms that she's been a little more fatigue over the past few months.    She's had some vitamin D level that are low in the past she takes 1 tablet a day most of the days.  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    She went to [employer] for undergraduate.    She is a nonsmoker no alcohol.    Family history patient has diabetes on her grandmother and grandfather on her father's side.  Her siblings are healthy her mother is healthy.    She had some borderline blood sugar in the past she states.    She is fasting today    Tobacco status: She  reports that she has never smoked. She does not have any smokeless tobacco history on file.    There are no active problems to display for this patient.      Current Outpatient Prescriptions   Medication Sig Dispense Refill     LACTOBACILLUS ACIDOPHILUS (PROBIOTIC ORAL) Take by mouth as needed.       No current facility-administered medications for this visit.        ROS:   10 point review of systems negative other than as outlined above, no fever or chills no swollen glands no nausea vomiting or respiratory symptoms.  She continues to work full-time.    Weight is stable    Objective:    Visit Vitals     /60 (Patient Site: Left Arm, Patient Position: Sitting, Cuff Size: Adult Regular)     Pulse 72     Temp 97.7  F (36.5  C) (Oral)     Resp 12     Ht 5' 6.5\" (1.689 m)     Wt 164 lb (74.4 kg)     LMP 01/07/2017     Breastfeeding No     BMI 26.07 kg/m2     Body mass index is 26.07 kg/(m^2).      General appearance no acute distress    HEENT: Neck is supple, oropharynx is clear, pupils " react normally, her eye movements are normal    No scleral icterus    Canals and TMs normal.    No adenopathy no bruit    Lungs are clear no rales rhonchi, heart regular S1-S2    Abdomen is soft nontender.    No axillary or inguinal adenopathy    Breast exam pelvic rectal deferred    Extremities without edema    No joint warmth redness or swelling.  She denies any skin changes or lesions.    Labs hemoglobin 11.8 normal indices, blood sugar look good 102    Cholesterol 183 HDL 58     Normal TSH    Vitamin D a little low at 20.7    Results for orders placed or performed in visit on 01/10/17   HM2(CBC w/o Differential)   Result Value Ref Range    WBC 5.4 4.0 - 11.0 thou/uL    RBC 4.13 3.80 - 5.40 mill/uL    Hemoglobin 11.8 (L) 12.0 - 16.0 g/dL    Hematocrit 36.1 35.0 - 47.0 %    MCV 87 80 - 100 fL    MCH 28.6 27.0 - 34.0 pg    MCHC 32.7 32.0 - 36.0 g/dL    RDW 11.6 11.0 - 14.5 %    Platelets 209 140 - 440 thou/uL    MPV 8.3 7.0 - 10.0 fL   Comprehensive Metabolic Panel   Result Value Ref Range    Sodium 141 136 - 145 mmol/L    Potassium 3.9 3.5 - 5.0 mmol/L    Chloride 108 (H) 98 - 107 mmol/L    CO2 24 22 - 31 mmol/L    Anion Gap, Calculation 9 5 - 18 mmol/L    Glucose 102 70 - 125 mg/dL    BUN 10 8 - 22 mg/dL    Creatinine 0.73 0.60 - 1.10 mg/dL    GFR MDRD Af Amer >60 >60 mL/min/1.73m2    GFR MDRD Non Af Amer >60 >60 mL/min/1.73m2    Bilirubin, Total 0.5 0.0 - 1.0 mg/dL    Calcium 9.1 8.5 - 10.5 mg/dL    Protein, Total 6.5 6.0 - 8.0 g/dL    Albumin 3.6 3.5 - 5.0 g/dL    Alkaline Phosphatase 63 45 - 120 U/L    AST 13 0 - 40 U/L    ALT 9 0 - 45 U/L   Lipid Cascade FASTING   Result Value Ref Range    Cholesterol 183 <=199 mg/dL    Triglycerides 42 <=149 mg/dL    HDL Cholesterol 58 >=50 mg/dL    LDL Calculated 117 <=129 mg/dL    Patient Fasting > 8hrs? Yes    Thyroid Stimulating Hormone (TSH)   Result Value Ref Range    TSH 1.50 0.30 - 5.00 uIU/mL   Vitamin D, Total (25-Hydroxy)   Result Value Ref Range    Vitamin  D, Total (25-Hydroxy) 20.6 (L) 30.0 - 80.0 ng/mL       Assessment:  1. Routine general medical examination at a health care facility  Comprehensive Metabolic Panel    Lipid Cascade FASTING   2. Fatigue  HM2(CBC w/o Differential)    Thyroid Stimulating Hormone (TSH)    Vitamin D, Total (25-Hydroxy)   3. Vitamin D deficiency        Stable physical    Fatigue question etiology did encourage her to double up on her vitamin D and recheck in 3 months    Mild anemia she does have some heavy bleeding at the beginning of her periods    At this point I wouldn't supplement with any iron.  Recheck CBC in 3 months as well    Increase activity to build stamina    Plan:  Follow-up earlier if new symptoms develop or worsening fatigue    This transcription uses voice recognition software, which may contain typographical errors.

## 2021-06-09 NOTE — PROGRESS NOTES
[facility] Discharge Summary  Patient Name: Ailyn Keane  Date: 3/27/2017  Referral Diagnosis: Vertigo  Referring provider: Tereso Armstrong  Visit Diagnosis:   1. BPPV (benign paroxysmal positional vertigo), right     2. Nausea     3. Unsteadiness on feet         Goal status: Unable to assess as patient did not return.    Patient was seen for 1 visit. The patient discontinued therapy, did not return.    The patient will need a new referral to resume.    Thank you for your referral.  Tere Mathew  3/27/2017  1:30 PM

## 2021-10-03 ENCOUNTER — HEALTH MAINTENANCE LETTER (OUTPATIENT)
Age: 31
End: 2021-10-03

## 2022-05-15 ENCOUNTER — HEALTH MAINTENANCE LETTER (OUTPATIENT)
Age: 32
End: 2022-05-15

## 2022-09-10 ENCOUNTER — HEALTH MAINTENANCE LETTER (OUTPATIENT)
Age: 32
End: 2022-09-10

## 2023-03-01 ENCOUNTER — OFFICE VISIT (OUTPATIENT)
Dept: URGENT CARE | Facility: URGENT CARE | Age: 33
End: 2023-03-01

## 2023-03-01 VITALS
HEART RATE: 92 BPM | DIASTOLIC BLOOD PRESSURE: 83 MMHG | TEMPERATURE: 98.6 F | BODY MASS INDEX: 29.07 KG/M2 | SYSTOLIC BLOOD PRESSURE: 123 MMHG | OXYGEN SATURATION: 100 % | WEIGHT: 185.6 LBS

## 2023-03-01 DIAGNOSIS — N92.6 MISSED PERIOD: Primary | ICD-10-CM

## 2023-03-01 DIAGNOSIS — R10.9 ABDOMINAL CRAMPING: ICD-10-CM

## 2023-03-01 LAB
ALBUMIN UR-MCNC: NEGATIVE MG/DL
APPEARANCE UR: CLEAR
BILIRUB UR QL STRIP: NEGATIVE
COLOR UR AUTO: YELLOW
GLUCOSE UR STRIP-MCNC: NEGATIVE MG/DL
HCG UR QL: NEGATIVE
HGB UR QL STRIP: NEGATIVE
KETONES UR STRIP-MCNC: NEGATIVE MG/DL
LEUKOCYTE ESTERASE UR QL STRIP: NEGATIVE
NITRATE UR QL: NEGATIVE
PH UR STRIP: 7.5 [PH] (ref 5–7)
SP GR UR STRIP: 1.01 (ref 1–1.03)
UROBILINOGEN UR STRIP-ACNC: 0.2 E.U./DL

## 2023-03-01 PROCEDURE — 81025 URINE PREGNANCY TEST: CPT | Performed by: PHYSICIAN ASSISTANT

## 2023-03-01 PROCEDURE — 99203 OFFICE O/P NEW LOW 30 MIN: CPT | Performed by: PHYSICIAN ASSISTANT

## 2023-03-01 PROCEDURE — 81003 URINALYSIS AUTO W/O SCOPE: CPT | Performed by: PHYSICIAN ASSISTANT

## 2023-03-01 NOTE — PROGRESS NOTES
Assessment & Plan     Missed period  - HCG qualitative urine; Future  - UA Macro with Reflex to Micro and Culture - lab collect; Future  - HCG qualitative urine  - UA Macro with Reflex to Micro and Culture - lab collect    Abdominal cramping  - HCG qualitative urine; Future  - UA Macro with Reflex to Micro and Culture - lab collect; Future  - HCG qualitative urine  - UA Macro with Reflex to Micro and Culture - lab collect    UA normal, urine pregnancy negative. Go to [facility] for ultrasound tomorrow at 9:30am. [facility] staff notified and handoff completed with [facility] provider.    Return in about 1 day (around 3/2/2023).     More Bell PA-C  [facility]    Chelsie Keane is a 32 year old who presents for the following health issues     Patient presents with:  Abdominal Pain: Lower abdominal pain. Severe cramping started on Friday. Missed menstrual cycle for 1 month. Some positive and some negative home pregnancy test.     CARLO Robertson presents to clinic today for evaluation of lower abdominal pain.  Symptoms first began about 5 days ago.  She had very significant left lower quadrant abdominal pain rated as a 9 out of 10.  Pain  radiated into her back as well.  She was expecting her menstrual bleeding to begin on Monday and she had a very small amount of brown bleeding Monday with no further bleeding after this.  She states that since then she has been giving a tugging sensation, fullness and abdominal pain sometimes in the left and sometimes in the right lower quadrant.  She has had urinary frequency but no other urinary symptoms, no changes in bowel movements.  She has been using natural family planning for contraception and notes that in this last mon problem th she and her partner were not doing anything to avoid conceiving but, not necessarily trying to conceive either.  She took 4 pregnancy tests and she was not confident in interpreting them but believes 2 were positive and 2  are negative.    Review of Systems   ROS negative except as stated above.      Objective    /83 (BP Location: Left arm, Patient Position: Sitting, Cuff Size: Adult Large)   Pulse 92   Temp 98.6  F (37  C) (Tympanic)   Wt 84.2 kg (185 lb 9.6 oz)   SpO2 100%   BMI 29.07 kg/m    Physical Exam   GENERAL: healthy, alert and no distress  NECK: no adenopathy, no asymmetry, masses, or scars and thyroid normal to palpation  RESP: lungs clear to auscultation - no rales, rhonchi or wheezes  CV: regular rate and rhythm, normal S1 S2, no S3 or S4, no murmur, click or rub, no peripheral edema and peripheral pulses strong  ABDOMEN: soft, non-tender to palpation, no hepatosplenomegaly, no masses and bowel sounds normal    Results for orders placed or performed in visit on 03/01/23   HCG qualitative urine     Status: Normal   Result Value Ref Range    hCG Urine Qualitative Negative Negative   UA Macro with Reflex to Micro and Culture - lab collect     Status: Abnormal    Specimen: Urine, Midstream   Result Value Ref Range    Color Urine Yellow Colorless, Straw, Light Yellow, Yellow    Appearance Urine Clear Clear    Glucose Urine Negative Negative mg/dL    Bilirubin Urine Negative Negative    Ketones Urine Negative Negative mg/dL    Specific Gravity Urine 1.015 1.003 - 1.035    Blood Urine Negative Negative    pH Urine 7.5 (H) 5.0 - 7.0    Protein Albumin Urine Negative Negative mg/dL    Urobilinogen Urine 0.2 0.2, 1.0 E.U./dL    Nitrite Urine Negative Negative    Leukocyte Esterase Urine Negative Negative    Narrative    Microscopic not indicated

## 2023-03-03 NOTE — PATIENT INSTRUCTIONS
If you have labs or imaging done, the results will automatically release in [employer] without an interpretation.  Your health care professional will review those results and send an interpretation with recommendations as soon as possible, but this may be 1-3 business days.    If you have any questions regarding your visit, please contact your care team.     [employer] [facility]: [phone]  [facility] CLINIC HOURS TELEPHONE NUMBER       Kathy Bueno MD  Assistant Medical Director    Amelia - Certified Medical Assistant     Med [place]-MARIA FERNANDA Jason-  Elissa-     Monday- [place]  8:00 a.m - 5:00 p.m    Tuesday- Surgery        Thursday- [place]  8:00 a.m - 5:00 p.m.    Friday- [place]  7:30 a.m - 4:00 p.m. [facility]  [address]  [place], [place] [address] [phone] Fax  Imaging Scheduling [phone]    [facility] Labor and Delivery  [address] [place] [address] Dr.  [place], [place] [address] [phone]    [address] [address] [phone] Fax  Imaging Scheduling [phone]     Urgent Care locations:  [facility] Monday-Friday  10 am - 8 pm  Saturday and Sunday   9 am - 5 pm  Monday-Friday   10 am - 8 pm  Saturday and Sunday   9 am - 5 pm   [phone]     **Surgeries** Our Surgery Schedulers will contact you to schedule. If you do not receive a call within 3 business days, please call [phone].    If you need a medication refill, please contact your pharmacy. Please allow 3 business days for your refill to be completed.    As always, thank you for trusting us with your healthcare needs!

## 2023-03-10 ENCOUNTER — OFFICE VISIT (OUTPATIENT)
Dept: OBGYN | Facility: CLINIC | Age: 33
End: 2023-03-10

## 2023-03-10 VITALS
DIASTOLIC BLOOD PRESSURE: 80 MMHG | HEART RATE: 80 BPM | WEIGHT: 191 LBS | BODY MASS INDEX: 29.98 KG/M2 | SYSTOLIC BLOOD PRESSURE: 141 MMHG | HEIGHT: 67 IN

## 2023-03-10 DIAGNOSIS — Z31.69 PRE-CONCEPTION COUNSELING: Primary | ICD-10-CM

## 2023-03-10 PROCEDURE — 99202 OFFICE O/P NEW SF 15 MIN: CPT | Performed by: OBSTETRICS & GYNECOLOGY

## 2023-03-10 NOTE — PROGRESS NOTES
OB/GYN New Consult      NAME:  Ailyn Keane  PCP:  No Ref-Primary, Physician  MRN:  [id number]      Impression / Plan     32 year old  with:      ICD-10-CM    1. Pre-conception counseling  Z31.69           Discussed fertility. She and her S.O. are healthy with no risk factors for infertility.  Plan to continue to keep a bleeding diary and continue timed intercourse. She will contact us when she has a positive pregnancy test.  She will contact us sooner if she is not pregnant after 12 months of ttc or if she develops gyn concerns.    Discussed prenatal vitamins and iron supplement.      She prefers to defer the Pap today.      Chief Complaint     Chief Complaint   Patient presents with     Consult       HPI     Ailyn Keane is a  32 year old female who is seen for fertility concerns.  Has been ttc for about amonth.     Patient's last menstrual period was 2023 (exact date).   Regular monthly periods.  Last 4-5 days.  Flow is moderate. Some cramping, but generally no significant cramping or severe pain.      No pain or gyn concerns.     S.O. is healthy .  Has not fathered children.  This will be the couple's first.     No history of pelvic infections or endometriosis.     Has never used contraception.       Problem List     Patient Active Problem List    Diagnosis Date Noted     CARDIOVASCULAR SCREENING; LDL GOAL LESS THAN 160 2013     Priority: Medium       Medications     Current Outpatient Medications   Medication     Prenatal Vit-Fe Fumarate-FA (PRENATAL PO)     albuterol (PROAIR HFA, PROVENTIL HFA, VENTOLIN HFA) 108 (90 BASE) MCG/ACT inhaler     albuterol (PROAIR HFA/PROVENTIL HFA/VENTOLIN HFA) 108 (90 Base) MCG/ACT inhaler     fluticasone (FLONASE) 50 MCG/ACT spray     guaiFENesin-dextromethorphan (ROBITUSSIN DM) 100-10 MG/5ML syrup     IBUPROFEN PO     No current facility-administered medications for this visit.        Allergies     No Known Allergies    Past Medical/Surgical  "History     History reviewed. No pertinent past medical history.    History reviewed. No pertinent surgical history.     Social History     Social History     Socioeconomic History     Marital status: Single     Spouse name: Not on file     Number of children: Not on file     Years of education: Not on file     Highest education level: Not on file   Occupational History     Not on file   Tobacco Use     Smoking status: Never     Smokeless tobacco: Never   Vaping Use     Vaping Use: Never used   Substance and Sexual Activity     Alcohol use: No     Drug use: No     Sexual activity: Yes     Partners: Male     Birth control/protection: None   Other Topics Concern     Parent/sibling w/ CABG, MI or angioplasty before 65F 55M? Not Asked   Social History Narrative     Not on file     Social Determinants of Health     Financial Resource Strain: Not on file   Food Insecurity: Not on file   Transportation Needs: Not on file   Physical Activity: Not on file   Stress: Not on file   Social Connections: Not on file   Intimate Partner Violence: Not on file   Housing Stability: Not on file       Family History      Family History   Problem Relation Age of Onset     No Known Problems Mother      No Known Problems Father      Diabetes No family hx of      Hypertension No family hx of      Cancer No family hx of      No Known Problems Sister      No Known Problems Brother        ROS     Pertinent positives and negatives are listed in the HPI.     Physical Exam   Vitals: BP (!) 141/80 (BP Location: Right arm)   Pulse 80   Ht 1.702 m (5' 7\")   Wt 86.6 kg (191 lb)   LMP 02/24/2023 (Exact Date)   BMI 29.91 kg/m      General: Comfortable, no obvious distress  Psych: Alert. Appropriate affect,.  Normal speech.   : deferred         21 min spent on the date of the encounter in chart review, patient visit, review of tests, documentation  about the issues documented above.     Kathy Bueno MD       "

## 2023-03-24 ENCOUNTER — PRENATAL OFFICE VISIT (OUTPATIENT)
Dept: OBGYN | Facility: CLINIC | Age: 33
End: 2023-03-24
Payer: COMMERCIAL

## 2023-03-24 VITALS — WEIGHT: 160 LBS | BODY MASS INDEX: 25.06 KG/M2

## 2023-03-24 DIAGNOSIS — Z34.00 SUPERVISION OF NORMAL FIRST PREGNANCY: Primary | ICD-10-CM

## 2023-03-24 DIAGNOSIS — Z23 NEED FOR TDAP VACCINATION: ICD-10-CM

## 2023-03-24 PROCEDURE — 99207 PR NO CHARGE NURSE ONLY: CPT

## 2023-03-24 NOTE — PROGRESS NOTES
Telephone visit with patient for New Prenatal Intake and Education. This is patient's 1st pregnancy. Handouts reviewed and will be provided at next prenatal appointment. Scheduled for New Prenatal with Meseret JACKSON on 5/9. US ordered for pt to schedule at 8 weeks and number provided.       Prenatal OB Questionnaire  Patient supplied answers from flow sheet for:  Prenatal OB Questionnaire.  Past Medical History  Have you ever received care for your mental health? : No  Have you ever been in a major accident or suffered serious trauma?: No  Within the last year, has anyone hit, slapped, kicked or otherwise hurt you?: No  In the last year, has anyone forced you to have sex when you didn't want to?: No    Past Medical History 2   Have you ever received a blood transfusion?: No  Would you accept a blood transfusion if was medically recommended?: Yes  Does anyone in your home smoke?: No   Is your blood type Rh negative?: Unknown  Have you ever ?: No  Have you been hospitalized for a nonsurgical reason excluding normal delivery?: No  Have you ever had an abnormal pap smear?: No    Past Medical History (Continued)  Do you have a history of abnormalities of the uterus?: No  Did your mother take FÉLIX or any other hormones when she was pregnant with you?: No  Do you have any other problems we have not asked about which you feel may be important to this pregnancy?: No    PHQ-2 Score:     PHQ-2 ( 1999 [employer]) 3/23/2023 3/10/2023   Q1: Little interest or pleasure in doing things 0 0   Q2: Feeling down, depressed or hopeless 0 0   PHQ-2 Score 0 0   Q1: Little interest or pleasure in doing things Not at all -   Q2: Feeling down, depressed or hopeless Not at all -   PHQ-2 Score 0 -         Allergies as of 3/24/2023:    Allergies as of 03/24/2023     (No Known Allergies)       Current medications are:  Current Outpatient Medications   Medication Sig Dispense Refill     Prenatal Vit-Fe Fumarate-FA (PRENATAL PO)            Early  ultrasound screening tool:    Does patient have irregular periods?  No  Did patient use hormonal birth control in the three months prior to positive urine pregnancy test? No  Is the patient breastfeeding?  No  Is the patient 10 weeks or greater at time of education visit?  No    US order placed.      Hannah Khan RN on 3/24/2023 at 11:18 AM

## 2023-04-26 ENCOUNTER — ANCILLARY PROCEDURE (OUTPATIENT)
Dept: ULTRASOUND IMAGING | Facility: CLINIC | Age: 33
End: 2023-04-26
Payer: MEDICAID

## 2023-04-26 DIAGNOSIS — Z34.00 SUPERVISION OF NORMAL FIRST PREGNANCY: ICD-10-CM

## 2023-04-26 PROCEDURE — 76801 OB US < 14 WKS SINGLE FETUS: CPT | Mod: TC | Performed by: RADIOLOGY

## 2023-04-26 PROCEDURE — 76817 TRANSVAGINAL US OBSTETRIC: CPT | Mod: TC | Performed by: RADIOLOGY

## 2023-05-05 NOTE — PATIENT INSTRUCTIONS
BEGIN TAKING ASA 81 mg on Friday May 19th at 12 weeks gestation.        If you have any questions regarding your visit, Please contact your care team.     [employer] [facility]: [phone]  To Schedule an Appointment 24/7  Call: [phone] TELEPHONE NUM[facility]   Meseret Roberto, LINCOLN, APRN, WHNP-BC    Casie -Surgery Scheduler  Elissa - Surgery Scheduler    MARIA FERNANDA [place], MARIA FERNANDA Samaniego, MARIA FERNANDA        [facility]  [address] [place] [address] [phone] Phone  [phone] Fax    Imaging Scheduling-All Locations [phone]    [facility]  [address] [place] [address]     Urgent Care locations:  [facility] Monday-Friday   10 am - 8 pm  Saturday and Sunday   9 am - 5 pm [phone]   [facility] Labor and Delivery:  [phone]    If you need a medication refill, please contact your pharmacy. Please allow 3 business days for your refill to be completed.  As always, Thank you for trusting us with your healthcare needs!  see additional instructions from your care team below

## 2023-05-08 LAB
ABO/RH(D): NORMAL
ANTIBODY SCREEN: NEGATIVE
SPECIMEN EXPIRATION DATE: NORMAL

## 2023-05-09 ENCOUNTER — PRENATAL OFFICE VISIT (OUTPATIENT)
Dept: OBGYN | Facility: CLINIC | Age: 33
End: 2023-05-09
Payer: MEDICAID

## 2023-05-09 VITALS — DIASTOLIC BLOOD PRESSURE: 78 MMHG | WEIGHT: 188.4 LBS | BODY MASS INDEX: 29.51 KG/M2 | SYSTOLIC BLOOD PRESSURE: 113 MMHG

## 2023-05-09 DIAGNOSIS — Z34.00 SUPERVISION OF NORMAL FIRST PREGNANCY, ANTEPARTUM: ICD-10-CM

## 2023-05-09 DIAGNOSIS — Z34.90 INTRAUTERINE PREGNANCY: Primary | ICD-10-CM

## 2023-05-09 LAB
ALBUMIN UR-MCNC: NEGATIVE MG/DL
APPEARANCE UR: CLEAR
BILIRUB UR QL STRIP: NEGATIVE
COLOR UR AUTO: YELLOW
ERYTHROCYTE [DISTWIDTH] IN BLOOD BY AUTOMATED COUNT: 13.6 % (ref 10–15)
GLUCOSE UR STRIP-MCNC: NEGATIVE MG/DL
HCT VFR BLD AUTO: 36.5 % (ref 35–47)
HGB BLD-MCNC: 12.1 G/DL (ref 11.7–15.7)
HGB UR QL STRIP: NEGATIVE
KETONES UR STRIP-MCNC: NEGATIVE MG/DL
LEUKOCYTE ESTERASE UR QL STRIP: NEGATIVE
MCH RBC QN AUTO: 29.2 PG (ref 26.5–33)
MCHC RBC AUTO-ENTMCNC: 33.2 G/DL (ref 31.5–36.5)
MCV RBC AUTO: 88 FL (ref 78–100)
NITRATE UR QL: NEGATIVE
PH UR STRIP: 6 [PH] (ref 5–7)
PLATELET # BLD AUTO: 219 10E3/UL (ref 150–450)
RBC # BLD AUTO: 4.14 10E6/UL (ref 3.8–5.2)
SP GR UR STRIP: <=1.005 (ref 1–1.03)
UROBILINOGEN UR STRIP-ACNC: 0.2 E.U./DL
WBC # BLD AUTO: 6.2 10E3/UL (ref 4–11)

## 2023-05-09 PROCEDURE — 86900 BLOOD TYPING SEROLOGIC ABO: CPT

## 2023-05-09 PROCEDURE — 86850 RBC ANTIBODY SCREEN: CPT

## 2023-05-09 PROCEDURE — 86762 RUBELLA ANTIBODY: CPT

## 2023-05-09 PROCEDURE — 86780 TREPONEMA PALLIDUM: CPT

## 2023-05-09 PROCEDURE — 86901 BLOOD TYPING SEROLOGIC RH(D): CPT

## 2023-05-09 PROCEDURE — 86803 HEPATITIS C AB TEST: CPT

## 2023-05-09 PROCEDURE — 36415 COLL VENOUS BLD VENIPUNCTURE: CPT

## 2023-05-09 PROCEDURE — 81003 URINALYSIS AUTO W/O SCOPE: CPT

## 2023-05-09 PROCEDURE — 87340 HEPATITIS B SURFACE AG IA: CPT

## 2023-05-09 PROCEDURE — 85027 COMPLETE CBC AUTOMATED: CPT

## 2023-05-09 PROCEDURE — 87086 URINE CULTURE/COLONY COUNT: CPT

## 2023-05-09 PROCEDURE — 87389 HIV-1 AG W/HIV-1&-2 AB AG IA: CPT

## 2023-05-09 PROCEDURE — 99207 PR FIRST OB VISIT: CPT

## 2023-05-09 NOTE — PROGRESS NOTES
SUBJECTIVE:     HPI:    This is a 32 year old female patient,  who presents for her first obstetrical visit.    ELLY: 2023, by Last Menstrual Period.    She is 10w4d weeks.    Her cycles are regular.    Her last menstrual period was normal.     Since her LMP, she has experienced  nausea and fatigue).   She denies emesis, abdominal pain, headache, loss of appetite, vaginal discharge, dysuria, pelvic pain, urinary urgency, lightheadedness, urinary frequency, vaginal bleeding, hemorrhoids and constipation.    Additional History:     OB History    Para Term  AB Living   1 0 0 0 0 0   SAB IAB Ectopic Multiple Live Births   0 0 0 0 0      # Outcome Date GA Lbr Natalio/2nd Weight Sex Delivery Anes PTL Lv   1 Current              No past medical history on file.      No results found for: PAP   Next PAP due: Plan for PP PAP     Father of baby: No known health issues    Have you travelled during the pregnancy? No  Have your sexual partner(s) travelled during the pregnancy?No      Planned Pregnancy: Yes  Marital Status:   Occupation: Office Setting   Living in Household: Spouse    Patient Active Problem List   Diagnosis     CARDIOVASCULAR SCREENING; LDL GOAL LESS THAN 160     Need for Tdap vaccination     No past surgical history on file.   Social History     Tobacco Use     Smoking status: Never     Smokeless tobacco: Never   Vaping Use     Vaping status: Never Used   Substance Use Topics     Alcohol use: No      Problem (# of Occurrences) Relation (Name,Age of Onset)    No Known Problems (4) Mother, Father, Sister, Brother       Negative family history of: Diabetes, Hypertension, Cancer            Current Outpatient Medications   Medication Sig     Prenatal Vit-Fe Fumarate-FA (PRENATAL PO)      No current facility-administered medications for this visit.     No Known Allergies      ROS: 10 point review of systems completed with no abnormals except as noted in HPI.      OBJECTIVE:     EXAM:  LMP  2023 (Exact Date)    /78   Wt 85.5 kg (188 lb 6.4 oz)   LMP 2023 (Exact Date)   BMI 29.51 kg/m        GENERAL: healthy, alert and no distress  EYES: Eyes grossly normal to inspection, PERRL and conjunctivae and sclerae normal  NECK: no adenopathy, no asymmetry, masses, or scars and thyroid normal to palpation  RESP: lungs clear to auscultation - no rales, rhonchi or wheezes  BREAST: deferred by patient  CV: regular rate and rhythm, normal S1 S2, no S3 or S4, no murmur, click or rub, no peripheral edema and peripheral pulses strong  ABDOMEN: soft, nontender, no hepatosplenomegaly, no masses and bowel sounds normal   (female): deferred  MS: no gross musculoskeletal defects noted, no edema  SKIN: no suspicious lesions or rashes  NEURO: Normal strength and tone, mentation intact and speech normal  PSYCH: mentation appears normal, affect normal/bright    ASSESSMENT/PLAN:     Ailyn is a 32 year old year old  at 10w4d who presents today for her initial OB visit.    (Z34.90) Intrauterine pregnancy  (primary encounter diagnosis)  (Z34.00) Supervision of normal first pregnancy  Plan: aspirin (ASA) 81 MG EC tablet, prophylaxis   NEISSERIA GONORRHOEA PCR,    CHLAMYDIA TRACHOMATIS PCR  PNV  NOB Labs Today  PAP at postpartum visit per patient request. She defers any female exam today  Discussed routine prenatal care, quad screen, GCT, anatomy scan at ~19 weeks, frequency of visits.  Discussed first trimester screen and she declines at this time  Delivery hospital: [facility]  Follow up in 4-6 weeks for return OB visit.  Recommended weight gain for pregnancy: 15-25 lbs.     Discussed physician coverage, tertiary support, diet, exercise, weight gain, schedule of visits, routine and indicated ultrasounds, and childbirth education.    Options for  testing for chromosomal and birth defects were discussed with the patient. Diagnostic tests include CVS and Amniocentesis. We discussed that these  tests are definitive but invasive and do carry a risk of fetal loss.   Screening tests include nuchal translucency/blood marker testing in the first trimester and quad screening in the second trimester. We discussed that these are screening tests and not diagnostic tests and that false positives and negatives are a distinct possibility.     Discussed aspirin use in pregnancy.  Low-dose aspirin prophylaxis can be beneficial in women at high risk of developing preeclampsia.  I generally recommend we begin aspirin at about 12-13 weeks gestation and continue until at least 36 weeks.    Women with at least one of the following conditions are considered high risk for developing preeclampsia: Previous pregnancy with preeclampsia,  multifetal-gestation, chronic hypertension, diabetes mellitus, chronic kidney disease, autoimmune disease, and IVF.    Women with more than 1 of the following conditions may also consider low-dose aspirin prophylaxis in pregnancy: Nulliparity, BMI greater than 30, family history of preeclampsia (mother or sister), AMA, socio-demographic characteristics, personal risk factors.      Patient DOES meet the above criteria. Discussed risks and benefits of low dose Asprin therapy and she elects to proceed.     30 minutes was spent face to face with the patient today discussing her history, diagnosis, and follow-up plan as noted above.        Meseret Davis, ERNA CNP

## 2023-05-10 LAB
HBV SURFACE AG SERPL QL IA: NONREACTIVE
HCV AB SERPL QL IA: NONREACTIVE
HIV 1+2 AB+HIV1 P24 AG SERPL QL IA: NONREACTIVE
RUBV IGG SERPL QL IA: 1.67 INDEX
RUBV IGG SERPL QL IA: POSITIVE
T PALLIDUM AB SER QL: NONREACTIVE

## 2023-05-11 LAB — BACTERIA UR CULT: NORMAL

## 2023-06-03 ENCOUNTER — HEALTH MAINTENANCE LETTER (OUTPATIENT)
Age: 33
End: 2023-06-03

## 2023-07-11 ENCOUNTER — ANCILLARY PROCEDURE (OUTPATIENT)
Dept: ULTRASOUND IMAGING | Facility: CLINIC | Age: 33
End: 2023-07-11
Payer: MEDICAID

## 2023-07-11 DIAGNOSIS — Z34.02 NORMAL FIRST PREGNANCY CONFIRMED, CURRENTLY IN SECOND TRIMESTER: Primary | ICD-10-CM

## 2023-07-11 DIAGNOSIS — Z34.00 SUPERVISION OF NORMAL FIRST PREGNANCY: ICD-10-CM

## 2023-07-11 PROCEDURE — 76805 OB US >/= 14 WKS SNGL FETUS: CPT | Mod: TC | Performed by: RADIOLOGY

## 2023-07-20 ENCOUNTER — PRENATAL OFFICE VISIT (OUTPATIENT)
Dept: OBGYN | Facility: CLINIC | Age: 33
End: 2023-07-20
Payer: MEDICAID

## 2023-07-20 VITALS
WEIGHT: 198 LBS | SYSTOLIC BLOOD PRESSURE: 111 MMHG | OXYGEN SATURATION: 98 % | DIASTOLIC BLOOD PRESSURE: 74 MMHG | HEART RATE: 80 BPM | BODY MASS INDEX: 31.01 KG/M2

## 2023-07-20 DIAGNOSIS — Z34.02 NORMAL FIRST PREGNANCY CONFIRMED, CURRENTLY IN SECOND TRIMESTER: ICD-10-CM

## 2023-07-20 DIAGNOSIS — Z11.3 ROUTINE SCREENING FOR STI (SEXUALLY TRANSMITTED INFECTION): Primary | ICD-10-CM

## 2023-07-20 PROCEDURE — 99207 PR PRENATAL VISIT: CPT

## 2023-07-20 PROCEDURE — 87591 N.GONORRHOEAE DNA AMP PROB: CPT

## 2023-07-20 PROCEDURE — 87491 CHLMYD TRACH DNA AMP PROBE: CPT

## 2023-07-20 NOTE — PATIENT INSTRUCTIONS
If you have any questions regarding your visit, Please contact your care team.     [employer]: [phone]  To Schedule an Appointment 24/7  Call: [phone]LE[facility]ONE [facility]   Meseret Roberto, DNP, APRN, NP-BC    Casie - Surgery Scheduler  Elissa - Surgery Scheduler    MARIA FERNANDA Edmonds, MARIA FERNANDA Samaniego RN   Monday- [place]  8:00 am - 12:00 pm    Tuesday- [place]  8:00 am - 4:30 pm    Wednesday- [place]  8:00 am - 4:30 pm    Thursday- [place]  8:00 am - 4:30 pm    Friday- [place]  8:00 am - 4:30 pm [facility]  [address] [place] [address]  Phone: [phone]   Fax: [phone]     Imaging Scheduling-All Locations [phone]    [facility]  [address] [place] [address]     Urgent Care locations:    [facility] Monday-Friday   10 am - 8 pm  Saturday and Sunday   9 am - 5 pm [phone]   [facility] Labor and Delivery:  [phone]    If you need a medication refill, please contact your pharmacy. Please allow 3 business days for your refill to be completed.  As always, Thank you for trusting us with your healthcare needs!  see additional instructions from your care team below  Weeks 18 to 22 of Your Pregnancy: Care Instructions  At this stage you may find that your nausea and fatigue are gone. You may feel better overall and have more energy. But you might now also have some new discomforts, like sleep problems or leg cramps.    You may start to feel your baby move. These movements can feel like butterflies or bubbles.   Babies at this stage can now suck their thumbs.     Get some exercise every day.  And avoid caffeine late in the day.     Take a warm shower or bath before bed.  Try relaxation exercises to calm your mind and body.     Use extra pillows.  They can help you get comfortable.     Don't use sleeping pills or alcohol.  They could  "harm your baby.     For leg cramps, stretch and apply heat.  A warm bath, leg warmers, a heating pad, or a hot water bottle can help with muscle aches.   Stretches for leg cramps    Straighten your leg and bend your foot (flex your ankle) slowly upward, toward your knee. Bend your toes up and down.   Stand on a flat surface. Stretch your toes upward. For balance, hold on to the wall or something stable. If it feels okay, take small steps walking on your heels.   Follow-up care is a key part of your treatment and safety. Be sure to make and go to all appointments, and call your doctor if you are having problems. It's also a good idea to know your test results and keep a list of the medicines you take.  Where can you learn more?  Go to https://www.[employer].Expertcloud.de/patiented  Enter W603 in the search box to learn more about \"Weeks 18 to 22 of Your Pregnancy: Care Instructions.\"  Current as of: November 9, 2022               Content Version: 13.7    0963-4421 [employer].   Care instructions adapted under license by your healthcare professional. If you have questions about a medical condition or this instruction, always ask your healthcare professional. [employer] disclaims any warranty or liability for your use of this information.    Round Ligament Pain: Care Instructions  Your Care Instructions     Round ligament pain is a common pain during pregnancy. You may feel a sharp brief pain on one or both sides of your belly. It may go down into your groin. It's usually felt for the first time during the second trimester.  This pain is a normal part of pregnancy. It will go away as your pregnancy continues or after your baby is born.  Your uterus is supported by two ligaments that go from the top and sides of the uterus to the bones of the pelvis. These are the round ligaments. As your uterus grows, these ligaments stretch and tighten with your movements. This may be the cause of the pain. You may " "find that certain activities seem to cause pain. If you can, avoid those activities.  Your doctor can usually diagnose round ligament pain from your symptoms and an exam. If you have bleeding or other symptoms, your doctor may also do an imaging test, such as an ultrasound. Your doctor may suggest that you take an over-the-counter pain medicine, such as acetaminophen.  Follow-up care is a key part of your treatment and safety. Be sure to make and go to all appointments, and call your doctor if you are having problems. It's also a good idea to know your test results and keep a list of the medicines you take.  How can you care for yourself at home?    If certain movements seem to trigger belly pain, see if you can avoid them while you are pregnant.    Stay active. If your doctor says it's okay, try moderate exercise. Water exercise may be a good choice if you have belly pain. Examples are swimming and water aerobics.    Ask your doctor about taking acetaminophen for pain. Be safe with medicines. Read and follow all instructions on the label.  When should you call for help?   Call your doctor now or seek immediate medical care if:      You think you might be in labor.       You have new or worse pain.   Watch closely for changes in your health, and be sure to contact your doctor if you have any problems.  Where can you learn more?  Go to https://www.Bihu.com.net/patiented  Enter R110 in the search box to learn more about \"Round Ligament Pain: Care Instructions.\"  Current as of: November 9, 2022               Content Version: 13.7    3795-4123 [employer].   Care instructions adapted under license by your healthcare professional. If you have questions about a medical condition or this instruction, always ask your healthcare professional. [employer] disclaims any warranty or liability for your use of this information.      Relieving Back Pain During Pregnancy: Wall Stretch, Body Bend   Before " trying these exercises, talk to your healthcare provider to make sure they are safe for you. Ask your healthcare provider how many times to do each exercise.   Wall stretch  This strengthens and loosens the muscles in your upper back:   1. Lean against a wall with a firm pillow or rolled towel under your shoulder blades. Your feet should be about 12 inches from the wall and shoulder-width apart. Point your chin down.  2. Breathe in. Push your shoulders, neck, and head against the wall. You will feel a stretch in your shoulders.  3. Hold for 5 counts. Then breathe out, and relax your shoulders and neck.   Body bend  This strengthens your back and buttocks muscles:   1. Stand with your legs shoulder-width apart. Put your hands on your upper thighs and bend your knees slightly.  2. Slowly bend forward at the hips. Push your hips back and keep your shoulders up. Make sure your back is straight. You ll feel a stretch in your upper thighs. You ll also feel your back muscles holding you in position.  3. Hold for 5 counts, then straighten.   [employer] last reviewed this educational content on 7/1/2021 2000-2023 [employer]. All rights reserved. This information is not intended as a substitute for professional medical care. Always follow your healthcare professional's instructions.

## 2023-07-20 NOTE — PROGRESS NOTES
Ailyn is a 32 year old   at 20w6d gestation who presents to the clinic for a routine prenatal visit.    Concerns at this visit:  Heart burn increasing    Patient denies vaginal bleeding, leaking of fluid from the vagina, or uterine contractions.  Patient reports fetal movement.    Gonorrhea and chlamydia testing not yet done. Patient will have these labs done today.  Anatomy ultrasound: Done 2023. Normal and Reassuring however they were not able to visualize all cardiac views. Repeat US ordered, patient will schedule.  RTC  in 4 weeks.    ERNA Cheng CNP

## 2023-07-21 LAB
C TRACH DNA SPEC QL NAA+PROBE: NEGATIVE
N GONORRHOEA DNA SPEC QL NAA+PROBE: NEGATIVE

## 2023-07-26 ENCOUNTER — ANCILLARY PROCEDURE (OUTPATIENT)
Dept: ULTRASOUND IMAGING | Facility: CLINIC | Age: 33
End: 2023-07-26
Payer: MEDICAID

## 2023-07-26 PROCEDURE — 76816 OB US FOLLOW-UP PER FETUS: CPT | Mod: TC | Performed by: RADIOLOGY

## 2023-08-01 ENCOUNTER — DOCUMENTATION ONLY (OUTPATIENT)
Dept: LAB | Facility: CLINIC | Age: 33
End: 2023-08-01
Payer: MEDICAID

## 2023-08-01 NOTE — PROGRESS NOTES
Per appt notes, [facility] is requesting a glucose check. Please place orders or contact pt with further info.

## 2023-08-02 DIAGNOSIS — Z13.1 SCREENING FOR DIABETES MELLITUS: Primary | ICD-10-CM

## 2023-08-03 NOTE — PROGRESS NOTES
, 22w6d.    Irene Samano, DO  [facility] [id number] hours ago (6:26 PM)     MM  Please give this patient instructions for the 1-hour GTT which is typically checked between 24-28 weeks gestation.  The order has been placed.     Pt is scheduled for a lab only appt on .  She will not be 24 weeks by then and needs to reschedule until she is at least 24 weeks.    Unable to reach patient via phone. Left message to call back at [phone].  I will also send her a [employer] message.    Kendal Serrano RN

## 2023-08-04 NOTE — PROGRESS NOTES
Pt read MARIA FERNANDA Edmonds's Roadmap message with advisement and scheduled lab for 8/11.    RN closing encounter.    Danette Sandoval RN on 8/4/2023 at 8:55 AM

## 2023-08-11 ENCOUNTER — LAB (OUTPATIENT)
Dept: LAB | Facility: CLINIC | Age: 33
End: 2023-08-11
Payer: MEDICAID

## 2023-08-11 DIAGNOSIS — Z13.1 SCREENING FOR DIABETES MELLITUS: ICD-10-CM

## 2023-08-11 DIAGNOSIS — Z13.1 SCREENING FOR DIABETES MELLITUS: Primary | ICD-10-CM

## 2023-08-11 LAB — GLUCOSE 1H P 50 G GLC PO SERPL-MCNC: 140 MG/DL (ref 70–129)

## 2023-08-11 PROCEDURE — 36415 COLL VENOUS BLD VENIPUNCTURE: CPT

## 2023-08-11 PROCEDURE — 82950 GLUCOSE TEST: CPT

## 2023-08-23 ENCOUNTER — LAB (OUTPATIENT)
Dept: LAB | Facility: CLINIC | Age: 33
End: 2023-08-23
Payer: MEDICAID

## 2023-08-23 DIAGNOSIS — Z13.1 SCREENING FOR DIABETES MELLITUS: ICD-10-CM

## 2023-08-23 LAB
GESTATIONAL GTT 1 HR POST DOSE: 159 MG/DL (ref 60–179)
GESTATIONAL GTT 2 HR POST DOSE: 112 MG/DL (ref 60–154)
GESTATIONAL GTT 3 HR POST DOSE: 103 MG/DL (ref 60–139)
GLUCOSE P FAST SERPL-MCNC: 103 MG/DL (ref 60–94)

## 2023-08-23 PROCEDURE — 82952 GTT-ADDED SAMPLES: CPT

## 2023-08-23 PROCEDURE — 82951 GLUCOSE TOLERANCE TEST (GTT): CPT

## 2023-08-23 PROCEDURE — 36415 COLL VENOUS BLD VENIPUNCTURE: CPT

## 2023-08-25 ENCOUNTER — PRENATAL OFFICE VISIT (OUTPATIENT)
Dept: OBGYN | Facility: CLINIC | Age: 33
End: 2023-08-25
Payer: MEDICAID

## 2023-08-25 VITALS
BODY MASS INDEX: 31.95 KG/M2 | SYSTOLIC BLOOD PRESSURE: 118 MMHG | WEIGHT: 204 LBS | DIASTOLIC BLOOD PRESSURE: 76 MMHG | OXYGEN SATURATION: 99 % | HEART RATE: 80 BPM

## 2023-08-25 DIAGNOSIS — Z34.02 NORMAL FIRST PREGNANCY CONFIRMED, CURRENTLY IN SECOND TRIMESTER: Primary | ICD-10-CM

## 2023-08-25 DIAGNOSIS — Z13.1 SCREENING FOR DIABETES MELLITUS: Primary | ICD-10-CM

## 2023-08-25 PROCEDURE — 99207 PR PRENATAL VISIT: CPT | Performed by: OBSTETRICS & GYNECOLOGY

## 2023-08-25 NOTE — PATIENT INSTRUCTIONS
If you have any questions regarding your visit, Please contact your care team.    [employer]: [phone]    To Schedule an Appointment   Call: [phone] TELEPHONE [facility]   DO. Casie Vazquez -Surgery Scheduler  Elissa - Surgery Scheduler    MARIA FERNANDA Edmonds, MARIA FERNANDA Samaniego, MARIA FERNANDA   [place]  Wednesday and Friday  8:30 a.m-5:00 p.m  [place]-Temporary  Monday 8:30 a.m-5:00 p.m  Typical Surgery day:  Tuesday [facility]  [address] [place] [address] [phone] Phone  [phone] Fax    Imaging Scheduling-All Locations [phone]    [facility]  [address] [place] [address]     Urgent Care locations:  [facility] Monday-Friday   10 am - 8 pm  Saturday and    9 am - 5 pm [phone]   **Surgeries** Our Surgery Schedulers will contact you to schedule. If you do not receive a call within 3 business days, please call [phone].    [facility] Labor and Delivery:  [phone]    If you need a medication refill, please contact your pharmacy. Please allow 3 business days for your refill to be completed.  As always, Thank you for trusting us with your healthcare needs!  see additional instructions from your care team below    Weeks 26 to 30 of Your Pregnancy: Care Instructions  You're starting your last trimester. You'll probably feel your baby moving around more. Your back may ache as your body gets used to your baby's size and length. Take care of yourself, and pay attention to what your body needs.    Talk to your doctor about getting the Tdap shot. It will help protect your  against whooping cough (pertussis). Also ask your doctor about flu and COVID-19 shots if you haven't had them yet. If your blood type is Rh negative, you may be given a shot of Rh immune globulin (such as RhoGAM). It can  "help prevent problems for your baby.   You may have [place] contractions. They are single or several strong contractions without a pattern. These are practice contractions but not the start of labor.   Be kind to yourself.     Take breaks when you're tired.  Change positions often. Don't sit for too long or stand for too long.  At work, rest during breaks if you can. If you don't get breaks, talk to your doctor about writing a letter to your employer to request them.  Avoid fumes, chemicals, and tobacco smoke.  Be sexual if you want to.     You may be interested in sex, or you may not. Everyone is different.  Sex is okay unless your doctor tells you not to.  Your belly can make it hard to find good positions for sex. [place] and explore.  Watch for signs of  labor.    These signs include:   Menstrual-like cramps. Or you may have pain or pressure in your pelvis that happens in a pattern.  About 6 or more contractions in an hour (even after rest and a glass of water).  A low, dull backache that doesn't go away when you change positions.  An increase or change in vaginal discharge.  Light vaginal bleeding or spotting.  Your water breaking.  Know what to do if you think you are having contractions.     Drink 1 or 2 glasses of water.  Lie down on your left side for at least an hour.  While on your side, feel the top of your belly to see if it's tight.  Write down your contractions for an hour. Time how long it is from the start of one contraction to the start of the next.  Call your doctor if you have regular contractions.  Follow-up care is a key part of your treatment and safety. Be sure to make and go to all appointments, and call your doctor if you are having problems. It's also a good idea to know your test results and keep a list of the medicines you take.  Where can you learn more?  Go to https://www.healthwise.net/patiented  Enter S999 in the search box to learn more about \"Weeks 26 to 30 of Your " "Pregnancy: Care Instructions.\"  Current as of: November 9, 2022               Content Version: 13.7    9677-9064 [employer].   Care instructions adapted under license by your healthcare professional. If you have questions about a medical condition or this instruction, always ask your healthcare professional. [employer] disclaims any warranty or liability for your use of this information.      "

## 2023-08-25 NOTE — PROGRESS NOTES
She reports feeling reassuring daily fetal activity and will continue to record.  She gained 6 lbs over the past 5 weeks and denies any fluid leakage or regular uterine contractions.  She understands that her fasting glucose value was elevated on her 3-hour GTT so will recheck a fasting glucose in the future since she is not in a fasting state currently.  Will also check hgb and RPR values.  She plans to transfer to [facility] next month so will need to send her prenatal records to the new clinic.

## 2024-07-07 ENCOUNTER — HEALTH MAINTENANCE LETTER (OUTPATIENT)
Age: 34
End: 2024-07-07

## 2025-07-13 ENCOUNTER — HEALTH MAINTENANCE LETTER (OUTPATIENT)
Age: 35
End: 2025-07-13